# Patient Record
Sex: FEMALE | Race: WHITE | Employment: FULL TIME | URBAN - NONMETROPOLITAN AREA
[De-identification: names, ages, dates, MRNs, and addresses within clinical notes are randomized per-mention and may not be internally consistent; named-entity substitution may affect disease eponyms.]

---

## 2022-02-09 ENCOUNTER — HOSPITAL ENCOUNTER (OUTPATIENT)
Age: 62
Discharge: HOME | End: 2022-02-09
Payer: COMMERCIAL

## 2022-02-09 ENCOUNTER — HOSPITAL ENCOUNTER (OUTPATIENT)
Dept: GENERAL RADIOLOGY | Age: 62
Discharge: HOME | End: 2022-02-09
Payer: COMMERCIAL

## 2022-02-09 DIAGNOSIS — G89.29 CHRONIC ELBOW PAIN, LEFT: ICD-10-CM

## 2022-02-09 DIAGNOSIS — M25.522 CHRONIC ELBOW PAIN, LEFT: ICD-10-CM

## 2022-02-09 PROBLEM — I48.0 PAROXYSMAL ATRIAL FIBRILLATION (HCC): Status: ACTIVE | Noted: 2022-02-09

## 2022-02-09 PROBLEM — I48.91 ATRIAL FIBRILLATION (HCC): Status: ACTIVE | Noted: 2022-02-09

## 2022-02-09 PROBLEM — I47.9 PAROXYSMAL TACHYCARDIA (HCC): Status: ACTIVE | Noted: 2022-02-09

## 2022-02-09 PROCEDURE — 73080 X-RAY EXAM OF ELBOW: CPT

## 2022-05-24 PROBLEM — M48.02 STENOSIS OF CERVICAL SPINE: Status: ACTIVE | Noted: 2022-05-24

## 2022-06-29 ENCOUNTER — HOSPITAL ENCOUNTER (OUTPATIENT)
Age: 62
Discharge: HOME | End: 2022-06-29
Payer: COMMERCIAL

## 2022-06-29 LAB
CHOLESTEROL, TOTAL: 188 MG/DL (ref 100–199)
HDLC SERPL-MCNC: 76 MG/DL
TSH SERPL DL<=0.05 MIU/L-ACNC: 2.48 UIU/ML (ref 0.4–4.2)

## 2022-06-29 PROCEDURE — 36415 COLL VENOUS BLD VENIPUNCTURE: CPT

## 2022-06-29 PROCEDURE — 84443 ASSAY THYROID STIM HORMONE: CPT

## 2022-06-29 PROCEDURE — 82465 ASSAY BLD/SERUM CHOLESTEROL: CPT

## 2022-06-29 PROCEDURE — 83718 ASSAY OF LIPOPROTEIN: CPT

## 2022-09-29 ENCOUNTER — NURSE ONLY (OUTPATIENT)
Dept: LAB | Age: 62
End: 2022-09-29

## 2022-10-10 LAB — CYTOLOGY THIN PREP PAP: NORMAL

## 2023-01-19 ENCOUNTER — APPOINTMENT (OUTPATIENT)
Dept: CT IMAGING | Age: 63
End: 2023-01-19
Payer: COMMERCIAL

## 2023-01-19 ENCOUNTER — HOSPITAL ENCOUNTER (OUTPATIENT)
Dept: INTERVENTIONAL RADIOLOGY/VASCULAR | Age: 63
Discharge: HOME | End: 2023-01-19
Payer: COMMERCIAL

## 2023-01-19 ENCOUNTER — APPOINTMENT (OUTPATIENT)
Dept: MRI IMAGING | Age: 63
End: 2023-01-19
Payer: COMMERCIAL

## 2023-01-19 ENCOUNTER — APPOINTMENT (OUTPATIENT)
Dept: GENERAL RADIOLOGY | Age: 63
End: 2023-01-19
Payer: COMMERCIAL

## 2023-01-19 ENCOUNTER — HOSPITAL ENCOUNTER (EMERGENCY)
Age: 63
Discharge: HOME | End: 2023-01-19
Attending: STUDENT IN AN ORGANIZED HEALTH CARE EDUCATION/TRAINING PROGRAM
Payer: COMMERCIAL

## 2023-01-19 VITALS
BODY MASS INDEX: 31.83 KG/M2 | HEART RATE: 97 BPM | WEIGHT: 173 LBS | DIASTOLIC BLOOD PRESSURE: 79 MMHG | SYSTOLIC BLOOD PRESSURE: 157 MMHG | RESPIRATION RATE: 18 BRPM | TEMPERATURE: 97.2 F | HEIGHT: 62 IN | OXYGEN SATURATION: 96 %

## 2023-01-19 VITALS
SYSTOLIC BLOOD PRESSURE: 140 MMHG | RESPIRATION RATE: 14 BRPM | HEART RATE: 70 BPM | OXYGEN SATURATION: 98 % | TEMPERATURE: 98 F | DIASTOLIC BLOOD PRESSURE: 79 MMHG

## 2023-01-19 DIAGNOSIS — R20.2 PARESTHESIAS: ICD-10-CM

## 2023-01-19 DIAGNOSIS — R20.0 NUMBNESS: Primary | ICD-10-CM

## 2023-01-19 LAB
ALBUMIN SERPL BCG-MCNC: 4.6 G/DL (ref 3.5–5.1)
ALP SERPL-CCNC: 125 U/L (ref 38–126)
ALT SERPL W/O P-5'-P-CCNC: 17 U/L (ref 11–66)
ANION GAP SERPL CALC-SCNC: 12 MEQ/L (ref 8–16)
APTT PPP: 29.3 SECONDS (ref 22–38)
AST SERPL-CCNC: 24 U/L (ref 5–40)
BASOPHILS # BLD: 0.7 %
BASOPHILS ABSOLUTE: 0.1 THOU/MM3 (ref 0–0.1)
BILIRUB SERPL-MCNC: 0.2 MG/DL (ref 0.3–1.2)
BUN SERPL-MCNC: 12 MG/DL (ref 7–22)
CALCIUM SERPL-MCNC: 9.6 MG/DL (ref 8.5–10.5)
CHLORIDE SERPL-SCNC: 103 MEQ/L (ref 98–111)
CO2 SERPL-SCNC: 25 MEQ/L (ref 23–33)
CREAT SERPL-MCNC: 0.7 MG/DL (ref 0.4–1.2)
EKG ATRIAL RATE: 92 BPM
EKG P AXIS: 62 DEGREES
EKG P-R INTERVAL: 176 MS
EKG Q-T INTERVAL: 350 MS
EKG QRS DURATION: 70 MS
EKG QTC CALCULATION (BAZETT): 432 MS
EKG R AXIS: 3 DEGREES
EKG T AXIS: 38 DEGREES
EKG VENTRICULAR RATE: 92 BPM
EOSINOPHIL # BLD: 1.3 %
EOSINOPHILS ABSOLUTE: 0.1 THOU/MM3 (ref 0–0.4)
ERYTHROCYTE [DISTWIDTH] IN BLOOD BY AUTOMATED COUNT: 13.5 % (ref 11.5–14.5)
ERYTHROCYTE [DISTWIDTH] IN BLOOD BY AUTOMATED COUNT: 42.1 FL (ref 35–45)
GFR SERPL CREATININE-BSD FRML MDRD: > 60 ML/MIN/1.73M2
GLUCOSE SERPL-MCNC: 101 MG/DL (ref 70–108)
HCT VFR BLD CALC: 41 % (ref 37–47)
HEMOGLOBIN: 13.7 GM/DL (ref 12–16)
IMMATURE GRANS (ABS): 0.01 THOU/MM3 (ref 0–0.07)
IMMATURE GRANULOCYTES: 0.1 %
INR PPP: 0.94 (ref 0.85–1.13)
LYMPHOCYTES # BLD: 27.6 %
LYMPHOCYTES ABSOLUTE: 2.1 THOU/MM3 (ref 1–4.8)
MCH RBC QN AUTO: 28.8 PG (ref 26–33)
MCHC RBC AUTO-ENTMCNC: 33.4 GM/DL (ref 32.2–35.5)
MCV RBC AUTO: 86.1 FL (ref 81–99)
MONOCYTES # BLD: 3.5 %
MONOCYTES ABSOLUTE: 0.3 THOU/MM3 (ref 0.4–1.3)
NUCLEATED RED BLOOD CELLS: 0 /100 WBC
OSMOLALITY SERPL CALC.SUM OF ELEC: 279.3 MOSMOL/KG (ref 275–300)
PLATELET # BLD: 302 THOU/MM3 (ref 130–400)
PMV BLD AUTO: 9.8 FL (ref 9.4–12.4)
POC CREATININE WHOLE BLOOD: 0.7 MG/DL (ref 0.5–1.2)
POTASSIUM SERPL-SCNC: 3.9 MEQ/L (ref 3.5–5.2)
PROT SERPL-MCNC: 7.1 G/DL (ref 6.1–8)
RBC # BLD: 4.76 MILL/MM3 (ref 4.2–5.4)
SEG NEUTROPHILS: 66.8 %
SEGMENTED NEUTROPHILS ABSOLUTE COUNT: 5 THOU/MM3 (ref 1.8–7.7)
SODIUM SERPL-SCNC: 140 MEQ/L (ref 135–145)
TROPONIN T: < 0.01 NG/ML
WBC # BLD: 7.5 THOU/MM3 (ref 4.8–10.8)

## 2023-01-19 PROCEDURE — 93005 ELECTROCARDIOGRAM TRACING: CPT | Performed by: STUDENT IN AN ORGANIZED HEALTH CARE EDUCATION/TRAINING PROGRAM

## 2023-01-19 PROCEDURE — 6360000002 HC RX W HCPCS: Performed by: RADIOLOGY

## 2023-01-19 PROCEDURE — 6360000004 HC RX CONTRAST MEDICATION: Performed by: STUDENT IN AN ORGANIZED HEALTH CARE EDUCATION/TRAINING PROGRAM

## 2023-01-19 PROCEDURE — 70496 CT ANGIOGRAPHY HEAD: CPT

## 2023-01-19 PROCEDURE — 85730 THROMBOPLASTIN TIME PARTIAL: CPT

## 2023-01-19 PROCEDURE — 84484 ASSAY OF TROPONIN QUANT: CPT

## 2023-01-19 PROCEDURE — 70553 MRI BRAIN STEM W/O & W/DYE: CPT

## 2023-01-19 PROCEDURE — 2709999900 IR NERVE BLOCK PROCEDURE

## 2023-01-19 PROCEDURE — 36415 COLL VENOUS BLD VENIPUNCTURE: CPT

## 2023-01-19 PROCEDURE — 93010 ELECTROCARDIOGRAM REPORT: CPT | Performed by: INTERNAL MEDICINE

## 2023-01-19 PROCEDURE — 99223 1ST HOSP IP/OBS HIGH 75: CPT | Performed by: NURSE PRACTITIONER

## 2023-01-19 PROCEDURE — 70450 CT HEAD/BRAIN W/O DYE: CPT

## 2023-01-19 PROCEDURE — 85025 COMPLETE CBC W/AUTO DIFF WBC: CPT

## 2023-01-19 PROCEDURE — A9579 GAD-BASE MR CONTRAST NOS,1ML: HCPCS | Performed by: STUDENT IN AN ORGANIZED HEALTH CARE EDUCATION/TRAINING PROGRAM

## 2023-01-19 PROCEDURE — 2580000003 HC RX 258: Performed by: RADIOLOGY

## 2023-01-19 PROCEDURE — 70498 CT ANGIOGRAPHY NECK: CPT

## 2023-01-19 PROCEDURE — 82565 ASSAY OF CREATININE: CPT

## 2023-01-19 PROCEDURE — 99285 EMERGENCY DEPT VISIT HI MDM: CPT | Performed by: STUDENT IN AN ORGANIZED HEALTH CARE EDUCATION/TRAINING PROGRAM

## 2023-01-19 PROCEDURE — 85610 PROTHROMBIN TIME: CPT

## 2023-01-19 PROCEDURE — 80053 COMPREHEN METABOLIC PANEL: CPT

## 2023-01-19 PROCEDURE — 71045 X-RAY EXAM CHEST 1 VIEW: CPT

## 2023-01-19 PROCEDURE — 64479 NJX AA&/STRD TFRM EPI C/T 1: CPT

## 2023-01-19 RX ORDER — 0.9 % SODIUM CHLORIDE 0.9 %
1000 INTRAVENOUS SOLUTION INTRAVENOUS ONCE
Status: DISCONTINUED | OUTPATIENT
Start: 2023-01-19 | End: 2023-01-19

## 2023-01-19 RX ORDER — WATER 1000 ML/1000ML
1 INJECTION, SOLUTION INTRAVENOUS ONCE
Status: COMPLETED | OUTPATIENT
Start: 2023-01-19 | End: 2023-01-19

## 2023-01-19 RX ORDER — DEXAMETHASONE SODIUM PHOSPHATE 4 MG/ML
4 INJECTION, SOLUTION INTRA-ARTICULAR; INTRALESIONAL; INTRAMUSCULAR; INTRAVENOUS; SOFT TISSUE ONCE
Status: COMPLETED | OUTPATIENT
Start: 2023-01-19 | End: 2023-01-19

## 2023-01-19 RX ADMIN — WATER 1 ML: 1 INJECTION INTRAMUSCULAR; INTRAVENOUS; SUBCUTANEOUS at 13:50

## 2023-01-19 RX ADMIN — IOPAMIDOL 80 ML: 755 INJECTION, SOLUTION INTRAVENOUS at 14:18

## 2023-01-19 RX ADMIN — DEXAMETHASONE SODIUM PHOSPHATE 4 MG: 4 INJECTION, SOLUTION INTRAMUSCULAR; INTRAVENOUS at 13:50

## 2023-01-19 RX ADMIN — GADOTERIDOL 15 ML: 279.3 INJECTION, SOLUTION INTRAVENOUS at 15:15

## 2023-01-19 ASSESSMENT — ENCOUNTER SYMPTOMS
NAUSEA: 0
VOMITING: 0
COUGH: 0
SORE THROAT: 0
SHORTNESS OF BREATH: 1
ABDOMINAL PAIN: 0
RHINORRHEA: 0

## 2023-01-19 ASSESSMENT — PAIN SCALES - GENERAL
PAINLEVEL_OUTOF10: 9
PAINLEVEL_OUTOF10: 4
PAINLEVEL_OUTOF10: 8

## 2023-01-19 ASSESSMENT — PAIN - FUNCTIONAL ASSESSMENT
PAIN_FUNCTIONAL_ASSESSMENT: 0-10
PAIN_FUNCTIONAL_ASSESSMENT: 0-10

## 2023-01-19 ASSESSMENT — PAIN DESCRIPTION - PAIN TYPE
TYPE: ACUTE PAIN
TYPE: CHRONIC PAIN

## 2023-01-19 ASSESSMENT — PAIN DESCRIPTION - LOCATION
LOCATION: ARM;LEG
LOCATION: ARM
LOCATION: ARM;LEG

## 2023-01-19 ASSESSMENT — PAIN DESCRIPTION - ORIENTATION
ORIENTATION: RIGHT;LEFT
ORIENTATION: RIGHT
ORIENTATION: RIGHT

## 2023-01-19 ASSESSMENT — PAIN DESCRIPTION - DESCRIPTORS
DESCRIPTORS: BURNING
DESCRIPTORS: SHOOTING

## 2023-01-19 ASSESSMENT — PAIN DESCRIPTION - DIRECTION: RADIATING_TOWARDS: DOWN BOTH ARMS

## 2023-01-19 ASSESSMENT — PAIN DESCRIPTION - ONSET: ONSET: ON-GOING

## 2023-01-19 ASSESSMENT — PAIN DESCRIPTION - FREQUENCY: FREQUENCY: CONTINUOUS

## 2023-01-19 NOTE — DISCHARGE INSTRUCTIONS
NERVE BLOCK DISCHARGE INSTRUCTION    1. Take it easy and rest the remainder of the day. 2.  Do not drive for the remainder of the day. 3.  You may use ice on the area of the injection to help alleviate discomfort. Avoid heat for the remainder of the day. 4.  Do not soak in water or use a tub bath or hot tub for the remainder of the day. 5.  You may resume normal eating and drinking. 6. This evening you may resume your pain medications and any other medications you had stopped prior to the injection. 7.  Resume activities starting tomorrow in gradual manner. You may resume physical therapy. 8. Follow up with ordering doctor if you continue to have pain. 9.  If you do have another nerve block ordered, follow instructions below:  Bring someone to drive you home. Nothing to eat or drink 2 hours prior. No blood thinners or aspirin products 5 days prior. 8.  Notify Radiology ([phone]), or go to the emergency room immediately if you develop any of the following symptoms: fever, chills, changes in mental status, severe pain, difficulty breathing, prolonged, severe headache, numbness or weakness in your arms or legs, loss of control of your bladder or bowel, excessive redness, swelling, or drainage from the area of injection.

## 2023-01-19 NOTE — H&P
Formulation and discussion of sedation / procedure plans, risks, benefits, side effects and alternatives with patient and/or responsible adult completed.     Electronically signed by Elo Seay MD on 1/19/2023 at 1:34 PM

## 2023-01-19 NOTE — PROGRESS NOTES
1305: Patient arrived ambulatory with  for nerve root block. Patient states she has held her aspirin for 5 days. Has been NPO since 10 AM.   Patient rights and responsibilities offered to patient. Patient states pain runs down both arms/hands. Burning/painful sensation. Rates about 4//10 at this time.  Hand grasp strong and equal.                             _m___ Safety:       (Environmental)  [place] to environment  Ensure ID band is correct and in place/ allergy band as needed  Assess for fall risk  Initiate fall precautions as applicable (fall band, side rails, etc.)  Call light within reach  Bed in low position/ wheels locked    __m__ Pain:       Assess pain level and characteristics  Administer analgesics as ordered  Assess effectiveness of pain management and report to MD as needed    _m___ Knowledge Deficit:  Assess baseline knowledge  Provide teaching at level of understanding  Provide teaching via preferred learning method  Evaluate teaching effectiveness    _m___ Hemodynamic/Respiratory Status:       (Pre and Post Procedure Monitoring)  Assess/Monitor vital signs and LOC  Assess Baseline SpO2 prior to any sedation  Obtain weight/height  Assess vital signs/ LOC until patient meets discharge criteria  Monitor procedure site and notify MD of any issues

## 2023-01-19 NOTE — ED PROVIDER NOTES
[address] [facility] [address] [address] EMERGENCY DEPT      EMERGENCY MEDICINE     Pt Name: Jeanie Amin  MRN: [id number]  [place] 1960  Date of evaluation: 1/19/2023  Provider: Bill Villanueva DO  Supervising Physician: Kai Adkins MD    CHIEF COMPLAINT       Chief Complaint   Patient presents with    Numbness     HISTORY OF PRESENT ILLNESS   Jeanie Amin is a pleasant 65 y.o. female who presents to the emergency department from  for evaluation of a CODE STROKE. Patient was having a procedure done on C5-C6 when she began to feel shooting pains down her left upper extremity. This was followed by a numb sensation to her right lower extremity and right upper extremity, this was described as a burning sensation. A code stroke was called. Is been noted that the patient has decreased sensation to light touch on her right side extremities. Patient has a history of A. fib with ablation, not on any blood thinners    PASTMEDICAL HISTORY     Past Medical History:   Diagnosis Date    Atrial fibrillation [facility])        Patient Active Problem List   Diagnosis Code    Paroxysmal atrial fibrillation ([facility] [place] [address] I48.0    Stenosis of cervical spine M48.02     SURGICAL HISTORY       Past Surgical History:   Procedure Laterality Date    ABLATION OF DYSRHYTHMIC FOCUS      CARPAL TUNNEL RELEASE Bilateral 2012    HYSTERECTOMY, VAGINAL  2008       CURRENT MEDICATIONS       Discharge Medication List as of 1/19/2023  4:27 PM        CONTINUE these medications which have NOT CHANGED    Details   diltiazem (CARDIZEM SR) 120 MG SR capsule Take 120 mg by mouth 2 times daily. ibuprofen (ADVIL;MOTRIN) 200 MG tablet Take 400 mg by mouth every 6 hours as needed. aspirin 81 MG tablet Take 81 mg by mouth daily. ALLERGIES     has No Known Allergies. FAMILY HISTORY     She indicated that her mother is alive. She indicated that her father is alive. She indicated that her sister is alive.        SOCIAL HISTORY       Social History     Tobacco Use Smoking status: Every Day     Packs/day: 0.50     Years: 40.00     Pack years: 20.00     Types: Cigarettes     Start date: 1/1/1980    Smokeless tobacco: Former   Substance Use Topics    Alcohol use: Yes     Comment: recreational    Drug use: No       PHYSICAL EXAM       ED Triage Vitals [01/19/23 1407]   BP Temp Temp src Heart Rate Resp SpO2 Height Weight   139/79 -- -- (!) 102 (!) 33 100 % -- --       Physical Exam  Vitals and nursing note reviewed. Constitutional:       General: She is not in acute distress. Appearance: She is not ill-appearing or toxic-appearing. HENT:      Head: Normocephalic and atraumatic. Right Ear: External ear normal.      Left Ear: External ear normal.      Nose: Nose normal. No congestion. Mouth/Throat:      Mouth: Mucous membranes are moist.      Pharynx: Oropharynx is clear. Eyes:      Conjunctiva/sclera: Conjunctivae normal.   Cardiovascular:      Rate and Rhythm: Normal rate and regular rhythm. Pulses: Normal pulses. Heart sounds: Normal heart sounds. Pulmonary:      Effort: Pulmonary effort is normal. No respiratory distress. Breath sounds: Normal breath sounds. No wheezing. Abdominal:      General: There is no distension. Palpations: Abdomen is soft. Tenderness: There is no abdominal tenderness. There is no guarding. Musculoskeletal:         General: Normal range of motion. Cervical back: Normal range of motion and neck supple. No tenderness. Lymphadenopathy:      Cervical: No cervical adenopathy. Skin:     General: Skin is warm and dry. Capillary Refill: Capillary refill takes less than 2 seconds. Neurological:      General: No focal deficit present. Mental Status: She is alert and oriented to person, place, and time. Sensory: Sensory deficit (Decreased sensation to light touch on the right upper and right lower extremities) present.       Comments: Ataxia present on the right   Psychiatric: Mood and Affect: Mood normal.       FORMAL DIAGNOSTIC RESULTS     RADIOLOGY: Interpretation per the Radiologist below, if available at the time of this note (none if blank):    XR CHEST PORTABLE   Final Result   There is no acute intrathoracic process. **This report has been created using voice recognition software. It may contain minor errors which are inherent in voice recognition technology. **      Final report electronically signed by Dr Quincy Delcid on 1/19/2023 3:59 PM      MRI BRAIN W WO CONTRAST   Final Result    Normal MRI of the brain. **This report has been created using voice recognition software. It may contain minor errors which are inherent in voice recognition technology. **         Final report electronically signed by Dr. Alec Koch MD on 1/19/2023 3:33 PM      CTA NECK W WO CONTRAST (CODE STROKE)   Final Result       1. Negative CTA of the head and neck. 2. Diffusion MRI scan would be helpful for better evaluation in this patient. **This report has been created using voice recognition software. It may contain minor errors which are inherent in voice recognition technology. **      Final report electronically signed by DR Janay Cerna on 1/19/2023 2:38 PM      CTA HEAD W WO CONTRAST (CODE STROKE)   Final Result       1. Negative CTA of the head and neck. 2. Diffusion MRI scan would be helpful for better evaluation in this patient. **This report has been created using voice recognition software. It may contain minor errors which are inherent in voice recognition technology. **      Final report electronically signed by DR Janay Cerna on 1/19/2023 2:38 PM      CT HEAD WO CONTRAST   Final Result    No evidence of acute intracranial abnormality. Findings were discussed with Dr. Susan Morales via telephone at the time of interpretation. **This report has been created using voice recognition software.  It may contain minor errors which are inherent in voice recognition technology. **      Final report electronically signed by Dr. Ameya Villalba MD on 1/19/2023 2:17 PM          LABS: (none if blank)  Labs Reviewed   CBC WITH AUTO DIFFERENTIAL - Abnormal; Notable for the following components:       Result Value    Monocytes Absolute 0.3 (*)     All other components within normal limits   COMPREHENSIVE METABOLIC PANEL W/ REFLEX TO MG FOR LOW K - Abnormal; Notable for the following components: Total Bilirubin 0.2 (*)     All other components within normal limits   TROPONIN   PROTIME-INR   APTT   ANION GAP   OSMOLALITY   GLOMERULAR FILTRATION RATE, ESTIMATED   POCT GLUCOSE   POCT CREATININE       (Any cultures that may have been sent were not resulted at the time of this patient visit)    [address] / ED COURSE:     1) Number and Complexity of Problems            Problem List This Visit:         Chief Complaint   Patient presents with    Numbness    Paresthesias        Differential Diagnosis includes (but not limited to): Adverse reaction from contrast, adverse procedure reaction, CVA, TIA, anxiety        Diagnoses Considered but I have low suspicion of:   Seizure, ACS             Pertinent Comorbid Conditions:    A. fib    2)  Data Reviewed (none if left blank)          My Independent interpretations:     EKG:      Normal sinus rhythm. Normal rate, normal rhythm, normal axis, no ST ovation, T wave inversion in V2, normal QRS, normal QTC    Imaging: CT head, CTA head and neck, and MRI brain were all read as negative    Labs:      Unremarkable                 Decision Rules/Clinical Scores utilized:  NIH Stroke Scale  Interval: Baseline  Level of Consciousness (1a): Alert  LOC Questions (1b):  Answers both correctly  LOC Commands (1c): Performs both tasks correctly  Best Gaze (2): Normal  Visual (3): No visual loss  Facial Palsy (4): Normal symmetrical movement  Motor Arm, Left (5a): No drift  Motor Arm, Right (5b): No drift  Motor Leg, Left (6a): No drift  Motor Leg, Right (6b): No drift  Limb Ataxia (7): (!) Present in one limb  Sensory (8): (!) Mild to Moderate  Best Language (9): No aphasia  Dysarthria (10): Normal  Extinction and Inattention (11): No abnormality  Total: 2             External Documentation Reviewed:         Previous patient encounter documents & history available on EMR was reviewed first time seen here             See Formal Diagnostic Results above for the lab and radiology tests and orders. 3)  Treatment and Disposition         ED Reassessment: Patient is pain-free and her symptoms have improved         Case discussed with consulting clinician: Yes, Viry Lay was there to evaluate the Pt. Shared Decision-Making was performed and disposition discussed with the        Patient/Family and questions answered yes         Social determinants of health impacting treatment or disposition: N/A         Code Status: Unknown      Summary of Patient Presentation:      MDM  Number of Diagnoses or Management Options  Diagnosis management comments: Patient is a 41-year-old female, well-appearing, who presents as a code stroke. I initially evaluated the patient in the CT scanner. She initially had an NIH of 2. The symptoms started shortly after she was undergoing a procedure and was injected with some contrast.  Patient has had some pain on the left upper extremity as well as some paresthesias down the left lower extremity, this made me initially doubtful that this is a true stroke given that the work-up was for right-sided extremity decreased sensation to light touch. CT head, CT head neck, MRI brain were negative. Patient's lab work was unremarkable. It is possible the patient had an adverse reaction to the procedure contrast, either way I do not think she has had a stroke and I think she is safe to go home. I made sure she was able to ambulate without difficulty before leaving, which she was. Amount and/or Complexity of Data Reviewed  Clinical lab tests: ordered and reviewed  Tests in the radiology section of CPT®: ordered and reviewed  Tests in the medicine section of CPT®: ordered and reviewed  Discuss the patient with other providers: yes  Independent visualization of images, tracings, or specimens: yes    Risk of Complications, Morbidity, and/or Mortality  Presenting problems: moderate  Management options: low    Critical Care  Total time providing critical care: 30-74 minutes    Patient Progress  Patient progress: improved  /  ED Course as of 01/19/23 1719   u Jan 19, 2023   1606 Patient states that now the numbness is just to the bottom of her right foot and that her right arm feels heavy but she feels like she can move both of her arms [AC]      ED Course User Index  [AC] Almas Zuluaga DO     Vitals Reviewed:    Vitals:    01/19/23 1407 01/19/23 1516 01/19/23 1548   BP: 139/79  (!) 140/79   Pulse: (!) 102 75 70   Resp: (!) 33 18 14   Temp:  98 °F (36.7 °C)    TempSrc:  Oral    SpO2: 100% 99% 98%       The patient was seen and examined. Appropriate diagnostic testing was performed and results reviewed with the patient. The results of pertinent diagnostic studies and exam findings were discussed. The patients provisional diagnosis and plan of care were discussed with the patient and present family who expressed understanding. Any medications were reviewed and indications and risks of medications were discussed with the patient /family present. Strict verbal and written return precautions, instructions and appropriate follow-up provided to  the patient.      ED Medications administered this visit:  (None if blank)  Medications   gadoteridol (PROHANCE) injection 13 mL (13 mLs IntraVENous Not Given 1/19/23 1515)   iopamidol (ISOVUE-370) 76 % injection 80 mL (80 mLs IntraVENous Given 1/19/23 1418)   gadoteridol (PROHANCE) injection 15 mL (15 mLs IntraVENous Given 1/19/23 1515) PROCEDURES: (None if blank)  Procedures:     CRITICAL CARE: (None if blank)      DISCHARGE PRESCRIPTIONS: (None if blank)  Discharge Medication List as of 1/19/2023  4:27 PM          FINAL IMPRESSION      1. Numbness    2. Paresthesias          DISPOSITION/PLAN   DISPOSITION Decision To Discharge 01/19/2023 04:27:09 PM      OUTPATIENT FOLLOW UP THE PATIENT:  Mindy Benz, APRN - CNP  [address]  [phone]    In 2 days  As needed    [facility] EMERGENCY DEPT  [address]    If symptoms worsen    Bill Villanueva DO    This transcription was electronically signed. Parts of this transcriptions may have been dictated by use of voice recognition software and electronically transcribed, and parts may have been transcribed with the assistance of an ED scribe. The transcription may contain errors not detected in proofreading. Please refer to my supervising physician's documentation if my documentation differs.     Electronically Signed: Bill Villanueva DO, 01/19/23, 5:19 PM      Bill Villanueva DO  Resident  01/19/23 6898

## 2023-01-19 NOTE — DISCHARGE INSTRUCTIONS
Follow-up with your primary care doctor within the next day or 2. Return here if symptoms worsen. Return here if develop difficulty speaking or difficulty moving arm or leg.

## 2023-01-19 NOTE — CONSULTS
Neurology Stroke Alert Note    Date:1/19/2023       Room:[address] [place] [address]  Patient Name:Leena Zuñiga     YOB: 1960     Age:62 y.o. Requesting Physician: Gracy Quiroga MD     Reason for Consult:  Evaluate for stroke alert    Subjective       HPI: Rabia Vazquez who is a 65 y.o. female with a history of atrial fibrillation s/p ablation who presents to [address] [place] [address] interventional radiology today for a left-sided C5-C6 nerve block by Dr. Jourdan Mccall. The procedure was initiated and shortly after the patient started to complain of right shoulder, right arm, right leg pain and numbness for which a stroke alert was activated. Her last known well was 1:55 PM.  She was promptly taken to the CT scanner where a CT of her head showed no acute intracranial abnormalities. CT angiogram head and neck negative for any hemodynamically significant stenosis. Her NIH was 2 for right-sided sensory deficit, right upper extremity ataxia. She was not deemed a candidate for TNK due to low NIH with minimal deficit. She is not deemed a candidate for thrombectomy due to lack of large vessel occlusion on CT angiogram head and neck. She denies any history of stroke. She does take a baby aspirin daily after her heart ablation and reports smoking approximately 10 cigarettes a day. She denies any lightheadedness, dizziness, headache, speech difficulty, vision changes, or facial droop. She did complain of chest tightness and difficulty breathing. Last known well: 1:55 PM.  Time of stroke alert: 1:58 PM   Time of neurology arrival: 1:59 PM.  Vascular risk factors: Atrial fibrillation. Initial glucose: 96.  Old deficits from prior stroke: N/A. INR in ED if anticoagulated:  not applicable. Initial blood pressure: 156/79. Initial NIHSS: 2.  Pre-morbid Modified [place] Scale: 0. Time of initial imaging read: 2:17 PM.  Thrombolytic administered:  not indicated/contraindicated.   Thrombectomy performed:  not indicated. Puncture time:  not applicable. Review of Systems   Review of Systems   Constitutional:  Negative for chills and fever. HENT:  Negative for rhinorrhea and sore throat. Eyes:  Negative for visual disturbance. Respiratory:  Positive for shortness of breath. Negative for cough. Cardiovascular:  Negative for chest pain and palpitations. Chest tightness   Gastrointestinal:  Negative for abdominal pain, nausea and vomiting. Genitourinary:  Negative for dysuria. Musculoskeletal:  Negative for arthralgias and myalgias. Skin:  Negative for rash. Neurological:  Negative for dizziness, weakness, numbness and headaches. Psychiatric/Behavioral:  The patient is not nervous/anxious. Medications   Scheduled Meds:   Continuous Infusions:   PRN Meds:   Medications Prior to Admission:   No current facility-administered medications on file prior to encounter. Current Outpatient Medications on File Prior to Encounter   Medication Sig Dispense Refill    diltiazem (CARDIZEM SR) 120 MG SR capsule Take 120 mg by mouth 2 times daily. ibuprofen (ADVIL;MOTRIN) 200 MG tablet Take 400 mg by mouth every 6 hours as needed. aspirin 81 MG tablet Take 81 mg by mouth daily. Past History    Past Medical History:   has a past medical history of Atrial fibrillation ([facility] [address] Social History:   reports that she has been smoking cigarettes. She started smoking about 43 years ago. She has a 20.00 pack-year smoking history. She has quit using smokeless tobacco. She reports current alcohol use. She reports that she does not use drugs. Family History:   Family History   Problem Relation Age of Onset    Hypertension Mother     Hypertension Father     Cancer Sister         thyroid       Physical Examination        NIH Stroke Scale:  1a.  Level of consciousness:  0 - alert; keenly responsive  1b. Level of consciousness questions:  0 - answers both questions correctly  1c.   Level of consciousness questions:  0 - performs both tasks correctly  2. Best Gaze:  0 - normal  3. Visual:  0 - no visual loss  4. Facial Palsy:  0 - normal symmetric movement  5a. Motor left arm:  0 - no drift, limb holds 90 (or 45) degrees for full 10 seconds  5b. Motor right arm:  0 - no drift, limb holds 90 (or 45) degrees for full 10 seconds  6a. Motor left le - no drift; leg holds 30 degree position for full 5 seconds  6b. Motor right le - no drift; leg holds 30 degree position for full 5 seconds  7. Limb Ataxia:  1 - present in one limb  8. Sensory:  1 - mild to moderate sensory loss; patient feels pinprick is less sharp or is dull on the affected side; there is a loss of superficial pain with pinprick but patient is aware of being touched   9. Best Language:  0 - no aphasia, normal  10. Dysarthria:  0 - normal  11. Extinction and Inattention:  0 - no abnormality    TOTAL:  2    Vitals:  /79   Pulse (!) 102   Resp (!) 33   SpO2 100%   Temp (24hrs), Av.2 °F (36.2 °C), Min:97.2 °F (36.2 °C), Max:97.2 °F (36.2 °C)      I/O (24Hr): No intake or output data in the 24 hours ending 23 1505    Physical Exam  Vitals reviewed. Constitutional:       General: She is not in acute distress. Appearance: Normal appearance. She is not ill-appearing. HENT:      Head: Normocephalic and atraumatic. Right Ear: External ear normal.      Left Ear: External ear normal.      Nose: Nose normal.      Mouth/Throat:      Mouth: Mucous membranes are moist.      Pharynx: No oropharyngeal exudate or posterior oropharyngeal erythema. Eyes:      Extraocular Movements: EOM normal.      Pupils: Pupils are equal, round, and reactive to light. Cardiovascular:      Rate and Rhythm: Normal rate and regular rhythm. Heart sounds: Normal heart sounds. No murmur heard. Pulmonary:      Effort: Pulmonary effort is normal. No respiratory distress.       Breath sounds: Normal breath sounds. No wheezing. Abdominal:      General: Bowel sounds are normal.      Palpations: Abdomen is soft. Tenderness: There is no abdominal tenderness. Musculoskeletal:      Right lower leg: No edema. Left lower leg: No edema. Skin:     General: Skin is warm. Findings: No rash. Neurological:      Mental Status: She is alert and oriented to person, place, and time. Coordination: Finger-Nose-Finger Test abnormal (Mild ataxia on the right). Heel to [facility] Test normal.      Deep Tendon Reflexes:      Reflex Scores:       Bicep reflexes are 2+ on the right side and 2+ on the left side. Brachioradialis reflexes are 2+ on the right side and 2+ on the left side. Patellar reflexes are 2+ on the right side and 2+ on the left side. Psychiatric:         Speech: Speech normal.      Comments: Anxious     Neurologic Exam     Mental Status   Oriented to person, place, and time. Registration: recalls 3 of 3 objects. Follows 2 step commands. Attention: normal. Concentration: normal.   Speech: speech is normal   Level of consciousness: alert  Knowledge: good. Able to name object. Able to read. Able to repeat. Normal comprehension. Cranial Nerves     CN II   Visual fields full to confrontation. CN III, IV, VI   Pupils are equal, round, and reactive to light. Extraocular motions are normal.   Right pupil: Size: 3 mm. Shape: regular. Reactivity: brisk. Left pupil: Size: 3 mm. Shape: regular. Reactivity: brisk. CN V   Facial sensation intact. CN VII   Facial expression full, symmetric.      CN VIII   CN VIII normal.     CN IX, X   CN IX normal.   CN X normal.   Palate: symmetric    CN XI   CN XI normal.   Right trapezius strength: normal  Left trapezius strength: normal    CN XII   CN XII normal.   Tongue: not atrophic  Fasciculations: absent  Tongue deviation: none    Motor Exam   Muscle bulk: normal  Overall muscle tone: normal  Right arm pronator drift: absent  Left arm pronator drift: absent    RUE: 4+/5  LUE: 5/5  BLE: 5/5     Sensory Exam   Right arm, right leg numbness     Gait, Coordination, and Reflexes     Coordination   Finger to nose coordination: abnormal (Mild ataxia on the right)  Heel to shin coordination: normal    Tremor   Resting tremor: absent  Intention tremor: absent  Action tremor: absent    Reflexes   Right brachioradialis: 2+  Left brachioradialis: 2+  Right biceps: 2+  Left biceps: 2+  Right patellar: 2+  Left patellar: 2+  Right ankle clonus: absent     Labs/Imaging/Diagnostics   Labs:  CBC:No results for input(s): WBC, RBC, HGB, HCT, MCV, RDW, PLT in the last 72 hours. CHEMISTRIES:No results for input(s): NA, K, CL, CO2, BUN, CREATININE, GLUCOSE, CA, PHOS, MG in the last 72 hours. COAGULATION STUDIES:No results for input(s): PROTIME, INR, APTT in the last 72 hours. LIVER PROFILE:No results for input(s): AST, ALT, BILIDIR, BILITOT, ALKPHOS in the last 72 hours. CHOLESTEROL AND A1C:No results for input(s): LDLCALC, HDL, CHOL, TRIG, LABA1C in the last 720 hours. Imaging Last 24 Hours:  CTA HEAD W WO CONTRAST (CODE STROKE)    Result Date: 1/19/2023  PROCEDURE: CTA HEAD W WO CONTRAST, CTA NECK W WO CONTRAST CLINICAL INFORMATION: stroke. COMPARISON: CT scan of the brain on the same day. TECHNIQUE: 1 mm axial images were obtained through the head and neck after the fast bolus administration of contrast. A noncontrast localizer was obtained. 3-D reconstructions were performed on a dedicated 3-D workstation. These include multiplanar MPR images and multiplanar MIP images. Centerline reconstructions were obtained of the carotid systems. Isovue intravenous contrast was given. All carotid artery measurements are performed utilizing Nascet criteria. This exam was analyzed using viz. AI contact CT LVO.  All CT scans at this facility use dose modulation, iterative reconstruction, and/or weight-based dosing when appropriate to reduce radiation dose to as low as reasonably achievable. FINDINGS: CTA NECK:  Aortic arch and branches: There is normal origin of the brachiocephalic, left common carotid and left subclavian arteries from the aortic arch Right common carotid artery/ICA: There is no significant hemodynamic stenosis in the right common and internal carotid arteries. Left common carotid artery/ICA: There is no significant hemodynamic stenosis in the left common and internal carotid arteries. Vertebral arteries: There is antegrade flow in the right and left vertebral arteries. CTA HEAD: Internal carotid arteries: There is antegrade flow in the internal carotid arteries bilaterally. Middle cerebral arteries: There is antegrade flow in the middle cerebral arteries bilaterally. Adrián Brian Anterior cerebral arteries: There is antegrade flow in the anterior cerebral arteries bilaterally. . Vertebral arteries: There is antegrade flow in both distal vertebral arteries. Basilar artery: There is antegrade flow in the basilar artery. Superior cerebellar arteries: There is antegrade flow in the left and right superior cerebellar arteries. Posterior cerebral arteries: There is antegrade flow in the posterior cerebral arteries. The proximal branches are also normal. No aneurysms, stenoses or occlusions are noted. The superior sagittal sinus, vein of Nikolas, internal cerebral veins, straight sinus, transverse sinuses and sigmoid sinuses are patent. Axial source data: There is increased soft tissue density in the nasopharynx consistent with enlarged adenoids. There is mild enlargement of the right and left lobes of the thyroid gland. There are postoperative changes in the cervical spine between C5 and C7.      1. Negative CTA of the head and neck. 2. Diffusion MRI scan would be helpful for better evaluation in this patient. **This report has been created using voice recognition software. It may contain minor errors which are inherent in voice recognition technology. ** Final report electronically signed by DR Wilmer Juarez on 1/19/2023 2:38 PM    CT HEAD WO CONTRAST    Result Date: 1/19/2023  PROCEDURE: CT HEAD WO CONTRAST CLINICAL INFORMATION: Stroke Symptoms. COMPARISON: CT head dated 9/5/2011. TECHNIQUE: Noncontrast 5 mm axial images were obtained through the brain. Sagittal and coronal reconstructions were created. All CT scans at this facility use dose modulation, iterative reconstruction, and/or weight-based dosing when appropriate to reduce radiation dose to as low as reasonably achievable. FINDINGS: The ventricles, cisterns and sulci are symmetric and normal in size and configuration. White matter differentiation appears grossly preserved. No intracranial hemorrhage, mass effect or midline shift is identified. The calvarium appears intact. Orbits are unremarkable. Paranasal sinuses and mastoid air cells are clear. No evidence of acute intracranial abnormality. Findings were discussed with Dr. Timothy Lee via telephone at the time of interpretation. **This report has been created using voice recognition software. It may contain minor errors which are inherent in voice recognition technology. ** Final report electronically signed by Dr. Jayla Hayes MD on 1/19/2023 2:17 PM    CTA NECK W WO CONTRAST (CODE STROKE)    Result Date: 1/19/2023  PROCEDURE: CTA HEAD W WO CONTRAST, CTA NECK W WO CONTRAST CLINICAL INFORMATION: stroke. COMPARISON: CT scan of the brain on the same day. TECHNIQUE: 1 mm axial images were obtained through the head and neck after the fast bolus administration of contrast. A noncontrast localizer was obtained. 3-D reconstructions were performed on a dedicated 3-D workstation. These include multiplanar MPR images and multiplanar MIP images. Centerline reconstructions were obtained of the carotid systems. Isovue intravenous contrast was given. All carotid artery measurements are performed utilizing Nascet criteria. This exam was analyzed using viz. AI contact CT LVO.  All CT scans at this facility use dose modulation, iterative reconstruction, and/or weight-based dosing when appropriate to reduce radiation dose to as low as reasonably achievable. FINDINGS: CTA NECK:  Aortic arch and branches: There is normal origin of the brachiocephalic, left common carotid and left subclavian arteries from the aortic arch Right common carotid artery/ICA: There is no significant hemodynamic stenosis in the right common and internal carotid arteries. Left common carotid artery/ICA: There is no significant hemodynamic stenosis in the left common and internal carotid arteries. Vertebral arteries: There is antegrade flow in the right and left vertebral arteries. CTA HEAD: Internal carotid arteries: There is antegrade flow in the internal carotid arteries bilaterally. Middle cerebral arteries: There is antegrade flow in the middle cerebral arteries bilaterally. Erika Jozef Anterior cerebral arteries: There is antegrade flow in the anterior cerebral arteries bilaterally. . Vertebral arteries: There is antegrade flow in both distal vertebral arteries. Basilar artery: There is antegrade flow in the basilar artery. Superior cerebellar arteries: There is antegrade flow in the left and right superior cerebellar arteries. Posterior cerebral arteries: There is antegrade flow in the posterior cerebral arteries. The proximal branches are also normal. No aneurysms, stenoses or occlusions are noted. The superior sagittal sinus, vein of Nikolas, internal cerebral veins, straight sinus, transverse sinuses and sigmoid sinuses are patent. Axial source data: There is increased soft tissue density in the nasopharynx consistent with enlarged adenoids. There is mild enlargement of the right and left lobes of the thyroid gland. There are postoperative changes in the cervical spine between C5 and C7.      1. Negative CTA of the head and neck. 2. Diffusion MRI scan would be helpful for better evaluation in this patient.  **This report has been created using voice recognition software. It may contain minor errors which are inherent in voice recognition technology. ** Final report electronically signed by DR Mj Mcdonough on 1/19/2023 2:38 PM    XR CHEST PORTABLE    Result Date: 1/19/2023  PROCEDURE: XR CHEST PORTABLE CLINICAL INFORMATION: 71-year-old female who developed strokelike symptoms during a cervical nerve block. COMPARISON: No prior study. TECHNIQUE: AP upright view of the chest was obtained. FINDINGS: Fusion hardware is partially visualized in the cervical spine. The lungs are clear. The cardiac silhouette and pulmonary vasculature are within normal limits. There is no significant pleural effusion or pneumothorax. Visualized portions of the upper abdomen are within normal limits. The osseous structures are intact. No acute fractures or suspicious osseous lesions. There is no acute intrathoracic process. **This report has been created using voice recognition software. It may contain minor errors which are inherent in voice recognition technology. ** Final report electronically signed by Dr Namrata Austin on 1/19/2023 3:59 PM    MRI BRAIN W WO CONTRAST    Result Date: 1/19/2023  PROCEDURE: MRI BRAIN W WO CONTRAST INDICATION:stroke like symptoms, recent C5, C6 injection, right side deficit. . COMPARISON: CT head from the same date. TECHNIQUE: Multiplanar and multiple spin echo T1 and T2-weighted images were obtained through the brain before and after the administration of intravenous contrast. 15 cc ProHance was injected in the right AC. FINDINGS: The ventricles, cisterns and sulci are symmetric and normal in size and configuration. No significant focal areas of abnormal T2/flair prolongation are identified within the parenchyma. No intra or extra-axial mass is identified. No focal areas of restricted diffusion are present. Following contrast administration, there are no focal areas of abnormal parenchymal or meningeal enhancement identified.  The major vascular flow voids appear patent. Orbits are unremarkable. Paranasal sinuses and mastoid air cells are clear. Normal MRI of the brain. **This report has been created using voice recognition software. It may contain minor errors which are inherent in voice recognition technology. ** Final report electronically signed by Dr. Carmelina Nguyen MD on 1/19/2023 3:33 PM    IR NERVE BLOCK PROCEDURE    Result Date: 1/19/2023   CERVICAL EPIDURAL STEROID INJECTION / TRANSFORAMINAL TECHNIQUE (selective nerve root block): CLINICAL INFORMATION:  Cervical radiculopathy. PERFORMED BY: Amina Paz. Olivier Mcfarland M.D. APPROACH: Transforaminal, C5-6, left side. FLUOROSCOPY TIME: 1 minute 57 seconds FLUOROSCOPIC IMAGES: 1 ESTIMATED BLOOD LOSS: Minimal Prior cervical epidural injections past 12 months: None. MEDICATION:  1 ml sterile water, 4 mg dexamethasone. PROCEDURE:  Signed informed consent was obtained prior to performing this procedure. The patient was placed on the fluoroscopic table in a supine position. The cervical spine was evaluated fluoroscopically to determine appropriate puncture site. The skin was marked, prepped, and draped in a sterile fashion. A 25-gauge spinal needle was then passed through the soft tissues of the neck making contact with the superior facet process at level specified above. The needle was then walked-off the facet process in an anterior direction into the transforaminal cervical epidural space. A small amount of iodinated contrast material was injected to confirm appropriate needle position and a fluoroscopic image was obtained for documentation. The medications listed above were then injected and the needle was removed. During the injection the patient began to complain of neurological symptoms. The procedure was then aborted and a code stroke was called. Successful transforaminal cervical block at C5-C6 on the left.  The patient began to demonstrate neurological symptoms upon injection and a code stroke was called. The procedure was then aborted. **This report has been created using voice recognition software. It may contain minor errors which are inherent in voice recognition technology. ** Final report electronically signed by Dr Alonso Kay on 1/19/2023 3:33 PM           Assessment and Plan:        Right sided arm and leg paresthesias and pain   CT head negative for any acute intracranial abnormalities  CT angiogram head and neck negative for any hemodynamically significant stenosis  Stat MRI brain with and without contrast negative for any acute intracranial abnormalities   If pain is controlled and she feels safe to go home, she is ok for discharge from a neurology standpoint      This case was discussed with Dr. Melquiades Lerma and he is in agreement with the assessment and plan.     Electronically signed by DORA Salas CNP on 1/19/23 at 4:31 PM EST

## 2023-01-19 NOTE — ED TRIAGE NOTES
Patient to ED from special procedures where patient was being given a nerve block. Patient states that she began having right side numbness and tingling. Patient on RN arrival appears to be hyperventilating and in a panic like manor. NIH 2. Patient denies taking blood thinners. Patient reports feeling short of breath. Special procedures report given her a 1 ml of contrast dye.

## 2023-01-22 ENCOUNTER — HOSPITAL ENCOUNTER (EMERGENCY)
Age: 63
Discharge: HOME | End: 2023-01-22
Attending: STUDENT IN AN ORGANIZED HEALTH CARE EDUCATION/TRAINING PROGRAM
Payer: COMMERCIAL

## 2023-01-22 VITALS
HEART RATE: 85 BPM | RESPIRATION RATE: 16 BRPM | DIASTOLIC BLOOD PRESSURE: 80 MMHG | SYSTOLIC BLOOD PRESSURE: 117 MMHG | OXYGEN SATURATION: 97 % | TEMPERATURE: 98.1 F

## 2023-01-22 DIAGNOSIS — M79.2 NEUROPATHIC PAIN: Primary | ICD-10-CM

## 2023-01-22 PROCEDURE — 99284 EMERGENCY DEPT VISIT MOD MDM: CPT

## 2023-01-22 PROCEDURE — 96372 THER/PROPH/DIAG INJ SC/IM: CPT

## 2023-01-22 PROCEDURE — 6360000002 HC RX W HCPCS: Performed by: STUDENT IN AN ORGANIZED HEALTH CARE EDUCATION/TRAINING PROGRAM

## 2023-01-22 RX ORDER — CYCLOBENZAPRINE HCL 10 MG
10 TABLET ORAL 3 TIMES DAILY PRN
Qty: 30 TABLET | Refills: 0 | Status: SHIPPED | OUTPATIENT
Start: 2023-01-22 | End: 2023-01-23 | Stop reason: SINTOL

## 2023-01-22 RX ORDER — ORPHENADRINE CITRATE 30 MG/ML
60 INJECTION INTRAMUSCULAR; INTRAVENOUS ONCE
Status: COMPLETED | OUTPATIENT
Start: 2023-01-22 | End: 2023-01-22

## 2023-01-22 RX ADMIN — ORPHENADRINE CITRATE 60 MG: 30 INJECTION INTRAMUSCULAR; INTRAVENOUS at 06:55

## 2023-01-22 ASSESSMENT — PAIN SCALES - GENERAL
PAINLEVEL_OUTOF10: 7
PAINLEVEL_OUTOF10: 6

## 2023-01-22 ASSESSMENT — PAIN - FUNCTIONAL ASSESSMENT
PAIN_FUNCTIONAL_ASSESSMENT: 0-10
PAIN_FUNCTIONAL_ASSESSMENT: 0-10

## 2023-01-22 ASSESSMENT — PAIN DESCRIPTION - LOCATION
LOCATION: GENERALIZED

## 2023-01-22 NOTE — DISCHARGE INSTRUCTIONS
Continue take your home medication as prescribed. Use Flexeril as needed to help with your pain. Follow-up with your primary care physician next week.

## 2023-01-22 NOTE — ED PROVIDER NOTES
[facility]      EMERGENCY MEDICINE     Pt Name: Colton Gallegos  MRN: [id number]  [place] 1960  Date of evaluation: 1/22/2023  Provider: Sergey Thompson, [facility],[address]       Chief Complaint   Patient presents with    Tingling     Pt arrives c/o \"numbness\" area from Thurs expanding in rt leg to above the knee and radiating into bilateral shoulder blades      HISTORY OF PRESENT ILLNESS   Colton Gallegos is a pleasant 65 y.o. female who presents to the emergency department from from home, as a walk in to the ED lobby for evaluation of diffuse pain and sensation. Patient had an epidural injection in her neck on 1/19. Patient had initiation of neurological symptoms during her procedure. Is code stroke was called. Patient had negative CTAs of the head and neck as well as a negative MRI of the brain. The patient has not not had resolution of her neurological symptoms since then. It was felt at that time she was safe for discharge home follow-up primary care physician. She saw her primary care physician on 1/20. They prescribed her meloxicam for pain but did not do anything else at that time. Patient presents with worsening needlelike sensation in her right leg that is spread up her right side into the muscles in her back. Patient denies any fevers or chills. Patient denies any bowel or bladder incontinence. Patient has any fevers or chills. Patient denies any IV drug use.     PASTMEDICAL HISTORY     Past Medical History:   Diagnosis Date    Atrial fibrillation [facility])        Patient Active Problem List   Diagnosis Code    Paroxysmal atrial fibrillation (HCC) I48.0    Stenosis of cervical spine M48.02     SURGICAL HISTORY       Past Surgical History:   Procedure Laterality Date    ABLATION OF DYSRHYTHMIC FOCUS      CARPAL TUNNEL RELEASE Bilateral 2012    HYSTERECTOMY, VAGINAL  2008       CURRENT MEDICATIONS       Previous Medications    ASPIRIN 81 MG TABLET    Take 81 mg by mouth daily. DILTIAZEM (CARDIZEM SR) 120 MG SR CAPSULE    Take 120 mg by mouth 2 times daily. IBUPROFEN (ADVIL;MOTRIN) 200 MG TABLET    Take 400 mg by mouth every 6 hours as needed. MELOXICAM (MOBIC) 7.5 MG TABLET    Take 1 tablet by mouth daily       ALLERGIES     has No Known Allergies. FAMILY HISTORY     She indicated that her mother is alive. She indicated that her father is alive. She indicated that her sister is alive. SOCIAL HISTORY       Social History     Tobacco Use    Smoking status: Every Day     Packs/day: 0.50     Years: 40.00     Pack years: 20.00     Types: Cigarettes     Start date: 1/1/1980    Smokeless tobacco: Former   Substance Use Topics    Alcohol use: Yes     Comment: recreational    Drug use: No       PHYSICAL EXAM       ED Triage Vitals [01/22/23 0625]   BP Temp Temp Source Heart Rate Resp SpO2 Height Weight   (!) 130/96 98.1 °F (36.7 °C) Oral 86 18 96 % -- --       Additional Vital Signs:  Vitals:    01/22/23 0720   BP: 117/80   Pulse: 85   Resp: 16   Temp:    SpO2: 97%     Physical Exam  Vitals and nursing note reviewed. Exam conducted with a chaperone present. Constitutional:       General: She is not in acute distress. Appearance: Normal appearance. She is normal weight. She is not ill-appearing or toxic-appearing. HENT:      Head: Normocephalic and atraumatic. Right Ear: External ear normal.      Left Ear: External ear normal.      Nose: Nose normal.      Mouth/Throat:      Mouth: Mucous membranes are moist.      Pharynx: Oropharynx is clear. Eyes:      Extraocular Movements: Extraocular movements intact. Pupils: Pupils are equal, round, and reactive to light. Cardiovascular:      Rate and Rhythm: Normal rate and regular rhythm. Pulmonary:      Effort: Pulmonary effort is normal.      Breath sounds: Normal breath sounds. Abdominal:      General: Abdomen is flat. There is no distension. Palpations: Abdomen is soft. Tenderness:  There is no abdominal tenderness. There is no guarding or rebound. Musculoskeletal:         General: Normal range of motion. Cervical back: Normal range of motion and neck supple. No rigidity or tenderness. Right lower leg: No edema. Left lower leg: No edema. Skin:     General: Skin is warm and dry. Capillary Refill: Capillary refill takes less than 2 seconds. Neurological:      Mental Status: She is alert and oriented to person, place, and time. Cranial Nerves: Cranial nerves 2-12 are intact. Sensory: Sensory deficit (Patient has sensation throughout her entire body, but reports that on her right side is more pins-and-needles and not entirely within normal limits.) present. Motor: Motor function is intact. Comments: Patient has no saddle anesthesia. FORMAL DIAGNOSTIC RESULTS     RADIOLOGY: Interpretation per the Radiologist below, if available at the time of this note (none if blank):     No orders to display       LABS: (none if blank)  Labs Reviewed - No data to display    (Any cultures that may have been sent were not resulted at the time of this patient visit)    [facility] / ED COURSE:     1) Number and Complexity of Problems            Problem List This Visit:         Chief Complaint   Patient presents with    Tingling     Pt arrives c/o \"numbness\" area from Thurs expanding in rt leg to above the knee and radiating into bilateral shoulder blades             Differential Diagnosis includes (but not limited to):  Muscle spasms, neuropathic pain        Diagnoses Considered but I have low suspicion of:   Syringomyelia, spinal cord damage,             Pertinent Comorbid Conditions:    Cervical stenosis    2)  Data Reviewed (none if left blank)          My Independent interpretations:     EKG:      none    Imaging: none    Labs:      none                 Decision Rules/Clinical Scores utilized:  none            External Documentation Reviewed: Previous patient encounter documents & history available on EMR was reviewed. Patient had MRI of the brain as well as CTA of the head and neck on 1/19. Patient follow-up with her primary care physician on 1/20             See Formal Diagnostic Results above for the lab and radiology tests and orders. 3)  Treatment and Disposition         ED Reassessment: Stable, mildly improved         Case discussed with consulting clinician:  none         Shared Decision-Making was performed and disposition discussed with the        Patient/Family and questions answered          Social determinants of health impacting treatment or disposition:           Code Status:  not addressed      Summary of Patient Presentation:      MDM  Number of Diagnoses or Management Options  Neuropathic pain: established, worsening     Amount and/or Complexity of Data Reviewed  Decide to obtain previous medical records or to obtain history from someone other than the patient: yes  Review and summarize past medical records: yes    Risk of Complications, Morbidity, and/or Mortality  Presenting problems: low  Diagnostic procedures: low  Management options: low  General comments: I reviewed all the patient's work-up over the last 3 days. Patient had MRI of the brain as well as CTAs of the head and neck. These were all negative. Since the patient has had continued symptoms since the initiation of her injection, this is likely a sequelae from the injection. Patient did receive dexamethasone, so it is likely that she still has symptoms from that injection. She describes neuropathic/muscle spasms, so I felt it was reasonable to treat the patient with Norflex. I will send the patient home with a prescription for Flexeril. I feel like any repeat imaging in the emergency department today would not elicit any significant change in the patient's work-up and outcome since she is already been ruled out for stroke.     Patient Progress  Patient progress: stable  /   Vitals Reviewed:    Vitals:    01/22/23 0625 01/22/23 0720   BP: (!) 130/96 117/80   Pulse: 86 85   Resp: 18 16   Temp: 98.1 °F (36.7 °C)    TempSrc: Oral    SpO2: 96% 97%       The patient was seen and examined. Appropriate diagnostic testing was performed and results reviewed with the patient. The results of pertinent diagnostic studies and exam findings were discussed. The patients provisional diagnosis and plan of care were discussed with the patient and present family who expressed understanding. Any medications were reviewed and indications and risks of medications were discussed with the patient /family present. Strict verbal and written return precautions, instructions and appropriate follow-up provided to  the patient . ED Medications administered this visit:  (None if blank)  Medications   orphenadrine (NORFLEX) injection 60 mg (60 mg IntraMUSCular Given 1/22/23 0655)         PROCEDURES: (None if blank)  Procedures:     CRITICAL CARE: (None if blank)      DISCHARGE PRESCRIPTIONS: (None if blank)  New Prescriptions    CYCLOBENZAPRINE (FLEXERIL) 10 MG TABLET    Take 1 tablet by mouth 3 times daily as needed for Muscle spasms       FINAL IMPRESSION      1.  Neuropathic pain          DISPOSITION/PLAN   DISPOSITION Decision To Discharge 01/22/2023 07:22:08 AM      OUTPATIENT FOLLOW UP THE PATIENT:  DORA Bruce CNP  [address]  01/22/23 9905

## 2023-01-22 NOTE — ED NOTES
Pt states tingling and pain persists. States \"I'm ok\". Plans to call her physician on Monday.       Gini Alonzo RN  01/22/23 3694

## 2023-01-22 NOTE — ED NOTES
Pt alert and oriented. Respirations regular and easy. Prescriptions given and instructed on. Discharge instructions reviewed. States understanding. Pt discharged in satisfactory condition.        Eleanor Robledo RN  01/22/23 1196

## 2023-03-09 ENCOUNTER — HOSPITAL ENCOUNTER (OUTPATIENT)
Dept: MRI IMAGING | Age: 63
Discharge: HOME | End: 2023-03-09
Payer: COMMERCIAL

## 2023-03-09 DIAGNOSIS — Z98.1 SPINAL ARTHRODESIS PRESENT: ICD-10-CM

## 2023-03-09 DIAGNOSIS — R20.0 NUMBNESS: ICD-10-CM

## 2023-03-09 PROCEDURE — 6360000004 HC RX CONTRAST MEDICATION: Performed by: PHYSICIAN ASSISTANT

## 2023-03-09 PROCEDURE — 72156 MRI NECK SPINE W/O & W/DYE: CPT

## 2023-03-09 PROCEDURE — A9579 GAD-BASE MR CONTRAST NOS,1ML: HCPCS | Performed by: PHYSICIAN ASSISTANT

## 2023-03-09 RX ADMIN — GADOTERIDOL 18 ML: 279.3 INJECTION, SOLUTION INTRAVENOUS at 13:41

## 2023-08-16 PROBLEM — M50.321 DEGENERATION OF INTERVERTEBRAL DISC AT C4-C5 LEVEL: Status: ACTIVE | Noted: 2023-08-16

## 2023-08-16 PROBLEM — S14.0XXA TRAUMATIC EDEMA OF CERVICAL SPINAL CORD (HCC): Status: ACTIVE | Noted: 2023-08-16

## 2023-10-18 ENCOUNTER — OFFICE VISIT (OUTPATIENT)
Dept: PHYSICAL MEDICINE AND REHAB | Age: 63
End: 2023-10-18
Payer: COMMERCIAL

## 2023-10-18 VITALS
WEIGHT: 171.4 LBS | DIASTOLIC BLOOD PRESSURE: 64 MMHG | HEIGHT: 62 IN | BODY MASS INDEX: 31.54 KG/M2 | SYSTOLIC BLOOD PRESSURE: 116 MMHG

## 2023-10-18 DIAGNOSIS — R53.81 DEBILITY: ICD-10-CM

## 2023-10-18 DIAGNOSIS — M47.812 CERVICAL SPONDYLOSIS: ICD-10-CM

## 2023-10-18 DIAGNOSIS — M79.18 MYOFASCIAL PAIN: ICD-10-CM

## 2023-10-18 DIAGNOSIS — R26.89 BALANCE DISORDER: ICD-10-CM

## 2023-10-18 DIAGNOSIS — M70.71 ISCHIAL BURSITIS OF RIGHT SIDE: ICD-10-CM

## 2023-10-18 DIAGNOSIS — Z98.1 S/P CERVICAL SPINAL FUSION: ICD-10-CM

## 2023-10-18 DIAGNOSIS — G62.9 NEUROPATHY: Primary | ICD-10-CM

## 2023-10-18 DIAGNOSIS — M47.816 LUMBAR SPONDYLOSIS: ICD-10-CM

## 2023-10-18 PROCEDURE — 99205 OFFICE O/P NEW HI 60 MIN: CPT | Performed by: NURSE PRACTITIONER

## 2023-10-26 ENCOUNTER — TELEPHONE (OUTPATIENT)
Dept: PHYSICAL MEDICINE AND REHAB | Age: 63
End: 2023-10-26

## 2023-10-26 NOTE — TELEPHONE ENCOUNTER
Received call from pt and at her request refaxed order for therapy to [facility] PT services.

## 2023-12-13 ENCOUNTER — OFFICE VISIT (OUTPATIENT)
Dept: PHYSICAL MEDICINE AND REHAB | Age: 63
End: 2023-12-13
Payer: COMMERCIAL

## 2023-12-13 VITALS
WEIGHT: 171 LBS | SYSTOLIC BLOOD PRESSURE: 110 MMHG | BODY MASS INDEX: 31.47 KG/M2 | DIASTOLIC BLOOD PRESSURE: 68 MMHG | HEIGHT: 62 IN

## 2023-12-13 DIAGNOSIS — R26.89 BALANCE DISORDER: ICD-10-CM

## 2023-12-13 DIAGNOSIS — Z98.1 S/P CERVICAL SPINAL FUSION: ICD-10-CM

## 2023-12-13 DIAGNOSIS — M79.18 MYOFASCIAL PAIN: ICD-10-CM

## 2023-12-13 DIAGNOSIS — G62.9 NEUROPATHY: Primary | ICD-10-CM

## 2023-12-13 DIAGNOSIS — M51.36 BULGING LUMBAR DISC: ICD-10-CM

## 2023-12-13 DIAGNOSIS — M47.812 CERVICAL SPONDYLOSIS: ICD-10-CM

## 2023-12-13 DIAGNOSIS — M47.816 LUMBAR SPONDYLOSIS: ICD-10-CM

## 2023-12-13 DIAGNOSIS — M54.16 LUMBAR RADICULOPATHY: ICD-10-CM

## 2023-12-13 DIAGNOSIS — R53.81 DEBILITY: ICD-10-CM

## 2023-12-13 DIAGNOSIS — M48.061 SPINAL STENOSIS OF LUMBAR REGION WITHOUT NEUROGENIC CLAUDICATION: ICD-10-CM

## 2023-12-13 PROCEDURE — 99215 OFFICE O/P EST HI 40 MIN: CPT | Performed by: NURSE PRACTITIONER

## 2023-12-13 RX ORDER — GABAPENTIN 300 MG/1
CAPSULE ORAL
COMMUNITY
Start: 2023-10-25

## 2024-07-18 PROBLEM — M25.50 POLYARTHRALGIA: Status: ACTIVE | Noted: 2024-07-18

## 2024-07-18 PROBLEM — M51.36 DDD (DEGENERATIVE DISC DISEASE), LUMBAR: Status: ACTIVE | Noted: 2024-07-18

## 2024-07-18 PROBLEM — Z98.1 HX OF FUSION OF CERVICAL SPINE: Status: ACTIVE | Noted: 2024-07-18

## 2024-07-18 PROBLEM — M51.369 DDD (DEGENERATIVE DISC DISEASE), LUMBAR: Status: ACTIVE | Noted: 2024-07-18

## 2024-08-14 ENCOUNTER — HOSPITAL ENCOUNTER (OUTPATIENT)
Age: 64
Discharge: HOME | End: 2024-08-14
Payer: COMMERCIAL

## 2024-08-14 DIAGNOSIS — Z00.00 ENCOUNTER FOR WELL ADULT EXAM WITHOUT ABNORMAL FINDINGS: ICD-10-CM

## 2024-08-14 LAB
ALBUMIN SERPL BCG-MCNC: 3.9 G/DL (ref 3.5–5.1)
ALP SERPL-CCNC: 108 U/L (ref 38–126)
ALT SERPL W/O P-5'-P-CCNC: 11 U/L (ref 11–66)
ANION GAP SERPL CALC-SCNC: 10 MEQ/L (ref 8–16)
AST SERPL-CCNC: 15 U/L (ref 5–40)
BASOPHILS ABSOLUTE: 0.1 THOU/MM3 (ref 0–0.1)
BASOPHILS NFR BLD AUTO: 0.8 %
BILIRUB CONJ SERPL-MCNC: < 0.1 MG/DL (ref 0.1–13.8)
BILIRUB SERPL-MCNC: < 0.2 MG/DL (ref 0.3–1.2)
BUN SERPL-MCNC: 18 MG/DL (ref 7–22)
CALCIUM SERPL-MCNC: 9.2 MG/DL (ref 8.5–10.5)
CHLORIDE SERPL-SCNC: 105 MEQ/L (ref 98–111)
CHOLESTEROL, FASTING: 132 MG/DL (ref 100–199)
CO2 SERPL-SCNC: 27 MEQ/L (ref 23–33)
CREAT SERPL-MCNC: 0.7 MG/DL (ref 0.4–1.2)
DEPRECATED RDW RBC AUTO: 45.4 FL (ref 35–45)
EOSINOPHIL NFR BLD AUTO: 3.5 %
EOSINOPHILS ABSOLUTE: 0.3 THOU/MM3 (ref 0–0.4)
ERYTHROCYTE [DISTWIDTH] IN BLOOD BY AUTOMATED COUNT: 13.4 % (ref 11.5–14.5)
GFR SERPL CREATININE-BSD FRML MDRD: > 90 ML/MIN/1.73M2
GLUCOSE SERPL-MCNC: 83 MG/DL (ref 70–108)
HCT VFR BLD AUTO: 37.5 % (ref 37–47)
HCV IGG SERPL QL IA: NEGATIVE
HDLC SERPL-MCNC: 48 MG/DL
HGB BLD-MCNC: 12.1 GM/DL (ref 12–16)
IMM GRANULOCYTES # BLD AUTO: 0.02 THOU/MM3 (ref 0–0.07)
IMM GRANULOCYTES NFR BLD AUTO: 0.2 %
LDLC SERPL CALC-MCNC: 69 MG/DL
LYMPHOCYTES ABSOLUTE: 4 THOU/MM3 (ref 1–4.8)
LYMPHOCYTES NFR BLD AUTO: 45.3 %
MCH RBC QN AUTO: 29.7 PG (ref 26–33)
MCHC RBC AUTO-ENTMCNC: 32.3 GM/DL (ref 32.2–35.5)
MCV RBC AUTO: 92.1 FL (ref 81–99)
MONOCYTES ABSOLUTE: 0.6 THOU/MM3 (ref 0.4–1.3)
MONOCYTES NFR BLD AUTO: 6.5 %
NEUTROPHILS ABSOLUTE: 3.8 THOU/MM3 (ref 1.8–7.7)
NEUTROPHILS NFR BLD AUTO: 43.7 %
NRBC BLD AUTO-RTO: 0 /100 WBC
PLATELET # BLD AUTO: 363 THOU/MM3 (ref 130–400)
PMV BLD AUTO: 10.5 FL (ref 9.4–12.4)
POTASSIUM SERPL-SCNC: 4.6 MEQ/L (ref 3.5–5.2)
PROT SERPL-MCNC: 6.5 G/DL (ref 6.1–8)
RBC # BLD AUTO: 4.07 MILL/MM3 (ref 4.2–5.4)
SODIUM SERPL-SCNC: 142 MEQ/L (ref 135–145)
TRIGLYCERIDE, FASTING: 76 MG/DL (ref 0–199)
TSH SERPL DL<=0.005 MIU/L-ACNC: 1.53 UIU/ML (ref 0.4–4.2)
WBC # BLD AUTO: 8.8 THOU/MM3 (ref 4.8–10.8)

## 2024-08-14 PROCEDURE — 87389 HIV-1 AG W/HIV-1&-2 AB AG IA: CPT

## 2024-08-14 PROCEDURE — 86803 HEPATITIS C AB TEST: CPT

## 2024-08-14 PROCEDURE — 80061 LIPID PANEL: CPT

## 2024-08-14 PROCEDURE — 85025 COMPLETE CBC W/AUTO DIFF WBC: CPT

## 2024-08-14 PROCEDURE — 80053 COMPREHEN METABOLIC PANEL: CPT

## 2024-08-14 PROCEDURE — 84443 ASSAY THYROID STIM HORMONE: CPT

## 2024-08-14 PROCEDURE — 82248 BILIRUBIN DIRECT: CPT

## 2024-08-14 PROCEDURE — 36415 COLL VENOUS BLD VENIPUNCTURE: CPT

## 2024-08-15 LAB — HIV 1+2 AB+HIV1 P24 AG SERPL QL IA: NORMAL

## 2024-08-31 ENCOUNTER — APPOINTMENT (OUTPATIENT)
Dept: GENERAL RADIOLOGY | Age: 64
DRG: 871 | End: 2024-08-31
Attending: EMERGENCY MEDICINE
Payer: COMMERCIAL

## 2024-08-31 ENCOUNTER — APPOINTMENT (OUTPATIENT)
Dept: CT IMAGING | Age: 64
DRG: 871 | End: 2024-08-31
Attending: EMERGENCY MEDICINE
Payer: COMMERCIAL

## 2024-08-31 ENCOUNTER — HOSPITAL ENCOUNTER (INPATIENT)
Age: 64
LOS: 6 days | Discharge: HOME | DRG: 871 | End: 2024-09-06
Attending: EMERGENCY MEDICINE
Payer: COMMERCIAL

## 2024-08-31 DIAGNOSIS — J18.9 PNEUMONIA OF BOTH LOWER LOBES DUE TO INFECTIOUS ORGANISM: ICD-10-CM

## 2024-08-31 DIAGNOSIS — N30.01 ACUTE CYSTITIS WITH HEMATURIA: ICD-10-CM

## 2024-08-31 DIAGNOSIS — J96.01 ACUTE RESPIRATORY FAILURE WITH HYPOXIA (HCC): Primary | ICD-10-CM

## 2024-08-31 DIAGNOSIS — I33.0 BACTERIAL ENDOCARDITIS, UNSPECIFIED CHRONICITY: ICD-10-CM

## 2024-08-31 LAB
ALBUMIN SERPL BCP-MCNC: 2.8 GM/DL (ref 3.4–5)
ALP SERPL-CCNC: 193 U/L (ref 46–116)
ALT SERPL W P-5'-P-CCNC: 21 U/L (ref 14–63)
AMORPH SED URNS QL MICRO: ABNORMAL
ANION GAP SERPL CALC-SCNC: 12 MEQ/L (ref 8–16)
AST SERPL W P-5'-P-CCNC: 19 U/L (ref 15–37)
BACTERIA URNS QL MICRO: ABNORMAL
BASOPHILS # BLD: 0.1 % (ref 0–3)
BASOPHILS ABSOLUTE: 0 THOU/MM3 (ref 0–0.1)
BILIRUB SERPL-MCNC: 0.4 MG/DL (ref 0.2–1)
BILIRUB UR QL STRIP.AUTO: NEGATIVE
BUN SERPL-MCNC: 14 MG/DL (ref 7–18)
CALCIUM SERPL-MCNC: 9.5 MG/DL (ref 8.5–10.1)
CASTS #/AREA URNS LPF: ABNORMAL /LPF
CHARACTER UR: ABNORMAL
CHLORIDE SERPL-SCNC: 98 MEQ/L (ref 98–107)
CO2 SERPL-SCNC: 27 MEQ/L (ref 21–32)
COLOR UR AUTO: ABNORMAL
CREAT SERPL-MCNC: 1.1 MG/DL (ref 0.6–1.3)
CRP SERPL-MCNC: 25.75 MG/DL (ref 0–1)
CRYSTALS VITF MICRO: ABNORMAL
EOSINOPHILS ABSOLUTE: 0.1 THOU/MM3 (ref 0–0.5)
EOSINOPHILS RELATIVE PERCENT: 0.4 % (ref 0–4)
EPI CELLS #/AREA URNS HPF: ABNORMAL /HPF
ERYTHROCYTE [SEDIMENTATION RATE] IN BLOOD BY WESTERGREN METHOD: 90 MM/HR (ref 0–20)
GFR SERPL CREATININE-BSD FRML MDRD: 57 ML/MIN/1.73M2
GLUCOSE SERPL-MCNC: 116 MG/DL (ref 74–106)
GLUCOSE UR QL STRIP.AUTO: NEGATIVE MG/DL
HCT VFR BLD CALC: 36.1 % (ref 37–47)
HEMOGLOBIN: 11.8 GM/DL (ref 12–16)
HGB UR STRIP.AUTO-MCNC: ABNORMAL MG/L
IMMATURE GRANS (ABS): 0.2 THOU/MM3 (ref 0–0.07)
IMMATURE GRANULOCYTES %: 1 %
INFLUENZA A BY PCR: NOT DETECTED
INFLUENZA B BY PCR: NOT DETECTED
KETONES UR QL STRIP.AUTO: NEGATIVE
LACTATE: 0.62 MMOL/L (ref 0.9–1.7)
LEUKOCYTE ESTERASE UR QL STRIP.AUTO: ABNORMAL
LIPASE SERPL-CCNC: 22 U/L (ref 16–77)
LYMPHOCYTES # BLD AUTO: 14.9 % (ref 15–47)
LYMPHOCYTES ABSOLUTE: 2.9 THOU/MM3 (ref 1–4.8)
MCH RBC QN AUTO: 29 PG (ref 26–32)
MCHC RBC AUTO-ENTMCNC: 32.7 GM/DL (ref 31–35)
MCV RBC AUTO: 88.7 FL (ref 81–99)
MONOCYTES: 1.7 THOU/MM3 (ref 0.3–1.3)
MONOCYTES: 8.5 % (ref 0–12)
MUCOUS THREADS URNS QL MICRO: ABNORMAL
NEUTROPHILS ABSOLUTE: 14.7 THOU/MM3 (ref 1.8–7.7)
NITRITE UR QL STRIP.AUTO: POSITIVE
PDW BLD-RTO: 13.4 % (ref 11.5–14.9)
PH UR STRIP.AUTO: 7 [PH] (ref 5–9)
PLATELET # BLD AUTO: 402 THOU/MM3 (ref 130–400)
PMV BLD AUTO: 9.3 FL (ref 9.4–12.4)
POTASSIUM SERPL-SCNC: 4 MEQ/L (ref 3.5–5.1)
PROCALCITONIN SERPL IA-MCNC: 0.25 NG/ML (ref 0.01–0.09)
PROT SERPL-MCNC: 7.8 GM/DL (ref 6.4–8.2)
PROT UR STRIP.AUTO-MCNC: >= 300 MG/DL
RBC # BLD: 4.07 MILL/MM3 (ref 4.1–5.3)
RBC #/AREA URNS HPF: ABNORMAL /HPF
REFLEX TO URINE C & S: ABNORMAL
SARS-COV-2 RNA, RT PCR: NOT DETECTED
SEG NEUTROPHILS: 75.6 % (ref 43–75)
SODIUM SERPL-SCNC: 137 MEQ/L (ref 136–145)
SP GR UR STRIP.AUTO: 1.02 (ref 1–1.03)
UROBILINOGEN UR STRIP-ACNC: 1 EU/DL (ref 0–1)
WBC # BLD: 19.5 THOU/MM3 (ref 4.8–10.8)
WBC # UR STRIP.AUTO: ABNORMAL /HPF

## 2024-08-31 PROCEDURE — 81001 URINALYSIS AUTO W/SCOPE: CPT

## 2024-08-31 PROCEDURE — 36415 COLL VENOUS BLD VENIPUNCTURE: CPT

## 2024-08-31 PROCEDURE — 96365 THER/PROPH/DIAG IV INF INIT: CPT

## 2024-08-31 PROCEDURE — 2580000003 HC RX 258

## 2024-08-31 PROCEDURE — 99285 EMERGENCY DEPT VISIT HI MDM: CPT

## 2024-08-31 PROCEDURE — 83605 ASSAY OF LACTIC ACID: CPT

## 2024-08-31 PROCEDURE — 80053 COMPREHEN METABOLIC PANEL: CPT

## 2024-08-31 PROCEDURE — 87040 BLOOD CULTURE FOR BACTERIA: CPT

## 2024-08-31 PROCEDURE — 87636 SARSCOV2 & INF A&B AMP PRB: CPT

## 2024-08-31 PROCEDURE — 87086 URINE CULTURE/COLONY COUNT: CPT

## 2024-08-31 PROCEDURE — 86140 C-REACTIVE PROTEIN: CPT

## 2024-08-31 PROCEDURE — 6360000002 HC RX W HCPCS

## 2024-08-31 PROCEDURE — 99223 1ST HOSP IP/OBS HIGH 75: CPT

## 2024-08-31 PROCEDURE — 87077 CULTURE AEROBIC IDENTIFY: CPT

## 2024-08-31 PROCEDURE — 71046 X-RAY EXAM CHEST 2 VIEWS: CPT

## 2024-08-31 PROCEDURE — 2580000003 HC RX 258: Performed by: EMERGENCY MEDICINE

## 2024-08-31 PROCEDURE — 85651 RBC SED RATE NONAUTOMATED: CPT

## 2024-08-31 PROCEDURE — 85025 COMPLETE CBC W/AUTO DIFF WBC: CPT

## 2024-08-31 PROCEDURE — 6370000000 HC RX 637 (ALT 250 FOR IP): Performed by: EMERGENCY MEDICINE

## 2024-08-31 PROCEDURE — 6370000000 HC RX 637 (ALT 250 FOR IP)

## 2024-08-31 PROCEDURE — 1200000003 HC TELEMETRY R&B

## 2024-08-31 PROCEDURE — 87186 SC STD MICRODIL/AGAR DIL: CPT

## 2024-08-31 PROCEDURE — 83690 ASSAY OF LIPASE: CPT

## 2024-08-31 PROCEDURE — 6360000002 HC RX W HCPCS: Performed by: EMERGENCY MEDICINE

## 2024-08-31 PROCEDURE — 74176 CT ABD & PELVIS W/O CONTRAST: CPT

## 2024-08-31 PROCEDURE — 84145 PROCALCITONIN (PCT): CPT

## 2024-08-31 PROCEDURE — 87801 DETECT AGNT MULT DNA AMPLI: CPT

## 2024-08-31 RX ORDER — ASPIRIN 81 MG/1
81 TABLET, CHEWABLE ORAL DAILY
Status: DISCONTINUED | OUTPATIENT
Start: 2024-09-01 | End: 2024-09-06 | Stop reason: HOSPADM

## 2024-08-31 RX ORDER — SODIUM CHLORIDE 9 MG/ML
INJECTION, SOLUTION INTRAVENOUS PRN
Status: DISCONTINUED | OUTPATIENT
Start: 2024-08-31 | End: 2024-09-06 | Stop reason: HOSPADM

## 2024-08-31 RX ORDER — SODIUM CHLORIDE 9 MG/ML
INJECTION, SOLUTION INTRAVENOUS CONTINUOUS
Status: ACTIVE | OUTPATIENT
Start: 2024-08-31 | End: 2024-09-01

## 2024-08-31 RX ORDER — DILTIAZEM HYDROCHLORIDE 60 MG/1
120 CAPSULE, EXTENDED RELEASE ORAL 2 TIMES DAILY
Status: DISCONTINUED | OUTPATIENT
Start: 2024-08-31 | End: 2024-09-06 | Stop reason: HOSPADM

## 2024-08-31 RX ORDER — SODIUM CHLORIDE 0.9 % (FLUSH) 0.9 %
5-40 SYRINGE (ML) INJECTION EVERY 12 HOURS SCHEDULED
Status: DISCONTINUED | OUTPATIENT
Start: 2024-08-31 | End: 2024-09-06 | Stop reason: HOSPADM

## 2024-08-31 RX ORDER — POLYETHYLENE GLYCOL 3350 17 G/17G
17 POWDER, FOR SOLUTION ORAL DAILY PRN
Status: DISCONTINUED | OUTPATIENT
Start: 2024-08-31 | End: 2024-09-06 | Stop reason: HOSPADM

## 2024-08-31 RX ORDER — GABAPENTIN 300 MG/1
600 CAPSULE ORAL NIGHTLY
Status: DISCONTINUED | OUTPATIENT
Start: 2024-08-31 | End: 2024-09-06 | Stop reason: HOSPADM

## 2024-08-31 RX ORDER — 0.9 % SODIUM CHLORIDE 0.9 %
1000 INTRAVENOUS SOLUTION INTRAVENOUS ONCE
Status: COMPLETED | OUTPATIENT
Start: 2024-08-31 | End: 2024-08-31

## 2024-08-31 RX ORDER — ACETAMINOPHEN 325 MG/1
650 TABLET ORAL EVERY 6 HOURS PRN
Status: DISCONTINUED | OUTPATIENT
Start: 2024-08-31 | End: 2024-09-06 | Stop reason: HOSPADM

## 2024-08-31 RX ORDER — PANTOPRAZOLE SODIUM 40 MG/1
40 TABLET, DELAYED RELEASE ORAL DAILY
Status: DISCONTINUED | OUTPATIENT
Start: 2024-09-01 | End: 2024-09-06 | Stop reason: HOSPADM

## 2024-08-31 RX ORDER — LEVOFLOXACIN 5 MG/ML
750 INJECTION, SOLUTION INTRAVENOUS ONCE
Status: COMPLETED | OUTPATIENT
Start: 2024-08-31 | End: 2024-08-31

## 2024-08-31 RX ORDER — SODIUM CHLORIDE 0.9 % (FLUSH) 0.9 %
5-40 SYRINGE (ML) INJECTION PRN
Status: DISCONTINUED | OUTPATIENT
Start: 2024-08-31 | End: 2024-09-06 | Stop reason: HOSPADM

## 2024-08-31 RX ORDER — ONDANSETRON 4 MG/1
4 TABLET, ORALLY DISINTEGRATING ORAL EVERY 8 HOURS PRN
Status: DISCONTINUED | OUTPATIENT
Start: 2024-08-31 | End: 2024-09-06 | Stop reason: HOSPADM

## 2024-08-31 RX ORDER — ACETAMINOPHEN 650 MG/1
650 SUPPOSITORY RECTAL EVERY 6 HOURS PRN
Status: DISCONTINUED | OUTPATIENT
Start: 2024-08-31 | End: 2024-09-06 | Stop reason: HOSPADM

## 2024-08-31 RX ORDER — ONDANSETRON 2 MG/ML
4 INJECTION INTRAMUSCULAR; INTRAVENOUS EVERY 6 HOURS PRN
Status: DISCONTINUED | OUTPATIENT
Start: 2024-08-31 | End: 2024-09-06 | Stop reason: HOSPADM

## 2024-08-31 RX ORDER — METRONIDAZOLE 500 MG/100ML
500 INJECTION, SOLUTION INTRAVENOUS EVERY 8 HOURS
Status: DISCONTINUED | OUTPATIENT
Start: 2024-08-31 | End: 2024-09-04

## 2024-08-31 RX ORDER — ENOXAPARIN SODIUM 100 MG/ML
40 INJECTION SUBCUTANEOUS DAILY
Status: DISCONTINUED | OUTPATIENT
Start: 2024-08-31 | End: 2024-08-31

## 2024-08-31 RX ORDER — POTASSIUM CHLORIDE 1500 MG/1
40 TABLET, EXTENDED RELEASE ORAL PRN
Status: DISCONTINUED | OUTPATIENT
Start: 2024-08-31 | End: 2024-09-06 | Stop reason: HOSPADM

## 2024-08-31 RX ORDER — IBUPROFEN 200 MG
600 TABLET ORAL ONCE
Status: COMPLETED | OUTPATIENT
Start: 2024-08-31 | End: 2024-08-31

## 2024-08-31 RX ORDER — GABAPENTIN 300 MG/1
300 CAPSULE ORAL EVERY MORNING
Status: DISCONTINUED | OUTPATIENT
Start: 2024-09-01 | End: 2024-09-06 | Stop reason: HOSPADM

## 2024-08-31 RX ORDER — MAGNESIUM SULFATE IN WATER 40 MG/ML
2000 INJECTION, SOLUTION INTRAVENOUS PRN
Status: DISCONTINUED | OUTPATIENT
Start: 2024-08-31 | End: 2024-09-06 | Stop reason: HOSPADM

## 2024-08-31 RX ORDER — POTASSIUM CHLORIDE 7.45 MG/ML
10 INJECTION INTRAVENOUS PRN
Status: DISCONTINUED | OUTPATIENT
Start: 2024-08-31 | End: 2024-09-06 | Stop reason: HOSPADM

## 2024-08-31 RX ADMIN — SODIUM CHLORIDE: 9 INJECTION, SOLUTION INTRAVENOUS at 22:31

## 2024-08-31 RX ADMIN — DILTIAZEM HYDROCHLORIDE 120 MG: 60 CAPSULE, EXTENDED RELEASE ORAL at 21:22

## 2024-08-31 RX ADMIN — GABAPENTIN 600 MG: 300 CAPSULE ORAL at 21:22

## 2024-08-31 RX ADMIN — SODIUM CHLORIDE 1000 ML: 9 INJECTION, SOLUTION INTRAVENOUS at 16:46

## 2024-08-31 RX ADMIN — LEVOFLOXACIN 750 MG: 750 INJECTION, SOLUTION INTRAVENOUS at 17:44

## 2024-08-31 RX ADMIN — IBUPROFEN 600 MG: 200 TABLET, FILM COATED ORAL at 16:43

## 2024-08-31 RX ADMIN — CEFTRIAXONE SODIUM 2000 MG: 2 INJECTION, POWDER, FOR SOLUTION INTRAMUSCULAR; INTRAVENOUS at 22:34

## 2024-08-31 RX ADMIN — SODIUM CHLORIDE, PRESERVATIVE FREE 10 ML: 5 INJECTION INTRAVENOUS at 21:23

## 2024-08-31 RX ADMIN — METRONIDAZOLE 500 MG: 500 INJECTION, SOLUTION INTRAVENOUS at 23:08

## 2024-08-31 ASSESSMENT — PAIN DESCRIPTION - ORIENTATION: ORIENTATION: MID

## 2024-08-31 ASSESSMENT — PAIN SCALES - GENERAL: PAINLEVEL_OUTOF10: 6

## 2024-08-31 ASSESSMENT — PAIN - FUNCTIONAL ASSESSMENT: PAIN_FUNCTIONAL_ASSESSMENT: 0-10

## 2024-08-31 ASSESSMENT — PAIN DESCRIPTION - LOCATION
LOCATION: ABDOMEN
LOCATION: HEAD;GENERALIZED

## 2024-08-31 NOTE — ED NOTES
Patient sent is stable condition on 2L O2 to [facility] for further treatment. Belongings sent with patient's .

## 2024-08-31 NOTE — ED PROVIDER NOTES
[facility]  [address] [place] [id number]  Phone: [phone]  EMERGENCY DEPARTMENT ENCOUNTER      Pt Name: Leena Zuñiga  MRN: [id number]  Birthdate 1960  Date of evaluation: 8/31/2024  Provider: Abhijeet Gallo MD    CHIEF COMPLAINT       Chief Complaint   Patient presents with    Fever    Generalized Body Aches         HISTORY OF PRESENT ILLNESS      Leena Zuñiga is a 63 y.o. female who presents to the emergency department with above noted complaint.  Patient has had some generalized bodyaches and feeling good all week.  Has fever now to 1.7.  Has some upper abdominal discomfort of a rather vague.  No associated vomiting or diarrhea.  She does feel short of breath.        REVIEW OF SYSTEMS     Positive shortness of breath.  Positive for fever.  No urinary symptoms  Review of Systems  All systems negative except as marked.     PAST MEDICAL HISTORY     Past Medical History:   Diagnosis Date    Atrial fibrillation (HCC)          SURGICAL HISTORY       Past Surgical History:   Procedure Laterality Date    ABLATION OF DYSRHYTHMIC FOCUS      CARPAL TUNNEL RELEASE Bilateral 2012    HYSTERECTOMY, VAGINAL  2008    NECK SURGERY  06/01/2022    C5-C7         CURRENT MEDICATIONS       Previous Medications    ASCORBIC ACID (VITAMIN C) 500 MG TABLET    Take 2 tablets by mouth every morning    ASPIRIN 81 MG TABLET    Take 1 tablet by mouth daily    CYANOCOBALAMIN (B-12) 3000 MCG LOZG        DILTIAZEM (CARDIZEM 12 HR) 120 MG EXTENDED RELEASE CAPSULE    Take 1 capsule by mouth 2 times daily    GABAPENTIN (NEURONTIN) 300 MG CAPSULE    take 1 capsule by mouth every morning then 1 capsule IN THE AFTERNOON and 2 capsules at bedtime    HANDICAP PLACARD MISC    by Does not apply route x5 years    MELOXICAM (MOBIC) 15 MG TABLET    Take 1 tablet by mouth daily    PANTOPRAZOLE (PROTONIX) 40 MG TABLET    Take 1 tablet by mouth daily    PREDNISONE (DELTASONE) 10 MG TABLET    Take 5 tablets by  urgent intervention.      PROCEDURES:  None  Procedures     FINAL IMPRESSION      1. Acute respiratory failure with hypoxia (HCC)    2. Pneumonia of both lower lobes due to infectious organism    3. Acute cystitis with hematuria    4.      Abnormal liver mass    DISPOSITION/PLAN     DISPOSITION    Condition at Disposition: Data Unavailable      PATIENT REFERRED TO:  No follow-up provider specified.    DISCHARGE MEDICATIONS:  New Prescriptions    No medications on file       (Please note that portions of this note were completed with a voice recognition program.  Efforts were made to edit the dictations but occasionally words are mis-transcribed.)    Abhijeet Gallo MD (electronically signed)  Attending Emergency Physician                       Abhijeet Gallo MD  08/31/24 6282

## 2024-08-31 NOTE — ED NOTES
Patient presents with complaint of fever, headache, body aches. States that her symptoms started on Monday. Denies being around anyone else who was sick. States that she has not taken any medications today including tylenol or motrin because she doesn't feel well. Patient noted to be 88% on room air. Placed on 2L o2. Respirations are even and unlabored. Skin is pink warm and dry.

## 2024-08-31 NOTE — ED NOTES
ED to inpatient nurses report    Chief Complaint   Patient presents with    Fever    Generalized Body Aches      Present to ED from home  LOC: alert and orientated to name, place, date  Vital signs   Vitals:    08/31/24 1745 08/31/24 1800 08/31/24 1825 08/31/24 1840   BP: 102/70 101/69 107/74 103/68   Pulse:       Resp:       Temp:  97.8 °F (36.6 °C)     TempSrc:  Temporal     SpO2: 95% 96% 91% 91%      Oxygen Baseline was 87-88% on room air upon presenting to er. Placed on 2L O2.     Current needs required 2L via nasal cannula Bipap/Cpap No  LDAs:   Peripheral IV 08/31/24 Left Antecubital (Active)   Site Assessment Clean, dry & intact 08/31/24 1840   Line Status Infusing 08/31/24 1840   Dressing Status Clean, dry & intact 08/31/24 1840     Mobility: Independent  Pending ED orders: finishing levaquin. Sent with ems transfer.  Present condition: Patient alert and oriented x4. Presents from home with complaint of fever and generalized body aches. Negative for flu and covid. Labs as noted. Vitals as noted. Belongings sent with patient's  except for clothes that she is wearing. Iv is patent and infusing.     C-SSRS Risk of Suicide: No Risk  Swallow Screening    Preferred Language: English     Electronically signed by Demetria Badillo RN on 8/31/2024 at 7:03 PM

## 2024-09-01 ENCOUNTER — APPOINTMENT (OUTPATIENT)
Dept: MRI IMAGING | Age: 64
DRG: 871 | End: 2024-09-01
Payer: COMMERCIAL

## 2024-09-01 LAB
ACB COMPLEX DNA BLD POS QL NAA+NON-PROBE: NOT DETECTED
ANION GAP SERPL CALC-SCNC: 12 MEQ/L (ref 8–16)
B FRAGILIS DNA BLD POS QL NAA+NON-PROBE: NOT DETECTED
BASOPHILS ABSOLUTE: 0.1 THOU/MM3 (ref 0–0.1)
BASOPHILS NFR BLD AUTO: 0.4 %
BLACTX-M ISLT/SPM QL: ABNORMAL
BLAIMP ISLT/SPM QL: ABNORMAL
BLAKPC ISLT/SPM QL: ABNORMAL
BLAOXA-48-LIKE ISLT/SPM QL: ABNORMAL
BLAVIM ISLT/SPM QL: ABNORMAL
BOTTLE TYPE: ABNORMAL
BUN SERPL-MCNC: 11 MG/DL (ref 7–22)
C ALBICANS DNA BLD POS QL NAA+NON-PROBE: NOT DETECTED
C AURIS DNA BLD POS QL NAA+NON-PROBE: NOT DETECTED
C GATTII+NEOFOR DNA BLD POS QL NAA+N-PRB: NOT DETECTED
C GLABRATA DNA BLD POS QL NAA+NON-PROBE: NOT DETECTED
C KRUSEI DNA BLD POS QL NAA+NON-PROBE: NOT DETECTED
C PARAP DNA BLD POS QL NAA+NON-PROBE: NOT DETECTED
C TROPICLS DNA BLD POS QL NAA+NON-PROBE: NOT DETECTED
CALCIUM SERPL-MCNC: 8.7 MG/DL (ref 8.5–10.5)
CHLORIDE SERPL-SCNC: 104 MEQ/L (ref 98–111)
CO2 SERPL-SCNC: 24 MEQ/L (ref 23–33)
COAG NEG STAPH DNA BLD QL NAA+PROBE: NOT DETECTED
COLISTIN RES MCR-1 ISLT/SPM QL: ABNORMAL
CREAT SERPL-MCNC: 0.7 MG/DL (ref 0.4–1.2)
DEPRECATED RDW RBC AUTO: 45.1 FL (ref 35–45)
E CLOAC COMP DNA BLD POS NAA+NON-PROBE: NOT DETECTED
E COLI DNA BLD POS QL NAA+NON-PROBE: NOT DETECTED
E FAECALIS DNA BLD POS QL NAA+NON-PROBE: NOT DETECTED
E FAECIUM DNA BLD POS QL NAA+NON-PROBE: NOT DETECTED
EKG ATRIAL RATE: 82 BPM
EKG P AXIS: 36 DEGREES
EKG P-R INTERVAL: 188 MS
EKG Q-T INTERVAL: 350 MS
EKG QRS DURATION: 68 MS
EKG QTC CALCULATION (BAZETT): 408 MS
EKG R AXIS: -2 DEGREES
EKG T AXIS: 21 DEGREES
EKG VENTRICULAR RATE: 82 BPM
ENTEROBACTERALES DNA BLD POS NAA+N-PRB: NOT DETECTED
EOSINOPHIL NFR BLD AUTO: 0.7 %
EOSINOPHILS ABSOLUTE: 0.1 THOU/MM3 (ref 0–0.4)
ERYTHROCYTE [DISTWIDTH] IN BLOOD BY AUTOMATED COUNT: 13.6 % (ref 11.5–14.5)
GFR SERPL CREATININE-BSD FRML MDRD: > 90 ML/MIN/1.73M2
GLUCOSE SERPL-MCNC: 92 MG/DL (ref 70–108)
GP B STREP DNA SPEC QL NAA+PROBE: NOT DETECTED
GP B STREP DNA SPEC QL NAA+PROBE: NOT DETECTED
HAEM INFLU DNA BLD POS QL NAA+NON-PROBE: NOT DETECTED
HCT VFR BLD AUTO: 32.2 % (ref 37–47)
HGB BLD-MCNC: 10.3 GM/DL (ref 12–16)
IMM GRANULOCYTES # BLD AUTO: 0.16 THOU/MM3 (ref 0–0.07)
IMM GRANULOCYTES NFR BLD AUTO: 0.8 %
K OXYTOCA DNA BLD POS QL NAA+NON-PROBE: NOT DETECTED
K OXYTOCA DNA BLD POS QL NAA+NON-PROBE: NOT DETECTED
KLEBSIELLA SP DNA BLD POS QL NAA+NON-PRB: NOT DETECTED
L MONOCYTOG DNA BLD POS QL NAA+NON-PROBE: NOT DETECTED
LYMPHOCYTES ABSOLUTE: 1.6 THOU/MM3 (ref 1–4.8)
LYMPHOCYTES NFR BLD AUTO: 8.1 %
MAGNESIUM SERPL-MCNC: 2.1 MG/DL (ref 1.6–2.4)
MCH RBC QN AUTO: 28.7 PG (ref 26–33)
MCHC RBC AUTO-ENTMCNC: 32 GM/DL (ref 32.2–35.5)
MCV RBC AUTO: 89.7 FL (ref 81–99)
MECA ISLT/SPM QL: ABNORMAL
MECA+MECC+MREJ ISLT/SPM QL: ABNORMAL
MONOCYTES ABSOLUTE: 1.8 THOU/MM3 (ref 0.4–1.3)
MONOCYTES NFR BLD AUTO: 9.3 %
N MEN DNA BLD POS QL NAA+NON-PROBE: NOT DETECTED
NDM: ABNORMAL
NEUTROPHILS ABSOLUTE: 16 THOU/MM3 (ref 1.8–7.7)
NEUTROPHILS NFR BLD AUTO: 80.7 %
NRBC BLD AUTO-RTO: 0 /100 WBC
P AERUGINOSA DNA BLD POS NAA+NON-PROBE: NOT DETECTED
PLATELET # BLD AUTO: 359 THOU/MM3 (ref 130–400)
PMV BLD AUTO: 10.2 FL (ref 9.4–12.4)
POTASSIUM SERPL-SCNC: 4.6 MEQ/L (ref 3.5–5.2)
PROTEUS SPP: NOT DETECTED
RBC # BLD AUTO: 3.59 MILL/MM3 (ref 4.2–5.4)
S AUREUS DNA BLD POS QL NAA+NON-PROBE: NOT DETECTED
S EPIDERMIDIS DNA BLD POS QL NAA+NON-PRB: NOT DETECTED
S LUGDUNENSIS DNA BLD POS QL NAA+NON-PRB: NOT DETECTED
S MALTOPHILIA DNA BLD POS QL NAA+NON-PRB: NOT DETECTED
S MARCESCENS DNA BLD POS NAA+NON-PROBE: NOT DETECTED
S PYO DNA THROAT QL NAA+PROBE: NOT DETECTED
SALMONELLA DNA BLD POS QL NAA+NON-PROBE: NOT DETECTED
SODIUM SERPL-SCNC: 140 MEQ/L (ref 135–145)
SOURCE OF BLOOD CULTURE: ABNORMAL
STREPTOCOCCUS DNA BLD QL NAA+PROBE: DETECTED
VANA+VANB ISLT/SPM QL: ABNORMAL
WBC # BLD AUTO: 19.8 THOU/MM3 (ref 4.8–10.8)

## 2024-09-01 PROCEDURE — 83735 ASSAY OF MAGNESIUM: CPT

## 2024-09-01 PROCEDURE — 85025 COMPLETE CBC W/AUTO DIFF WBC: CPT

## 2024-09-01 PROCEDURE — 74183 MRI ABD W/O CNTR FLWD CNTR: CPT

## 2024-09-01 PROCEDURE — 93010 ELECTROCARDIOGRAM REPORT: CPT | Performed by: NUCLEAR MEDICINE

## 2024-09-01 PROCEDURE — 99233 SBSQ HOSP IP/OBS HIGH 50: CPT | Performed by: FAMILY MEDICINE

## 2024-09-01 PROCEDURE — 6370000000 HC RX 637 (ALT 250 FOR IP)

## 2024-09-01 PROCEDURE — 6360000002 HC RX W HCPCS

## 2024-09-01 PROCEDURE — 36415 COLL VENOUS BLD VENIPUNCTURE: CPT

## 2024-09-01 PROCEDURE — 2580000003 HC RX 258

## 2024-09-01 PROCEDURE — 80048 BASIC METABOLIC PNL TOTAL CA: CPT

## 2024-09-01 PROCEDURE — 1200000003 HC TELEMETRY R&B

## 2024-09-01 PROCEDURE — A9579 GAD-BASE MR CONTRAST NOS,1ML: HCPCS

## 2024-09-01 PROCEDURE — 6360000004 HC RX CONTRAST MEDICATION

## 2024-09-01 PROCEDURE — 93005 ELECTROCARDIOGRAM TRACING: CPT

## 2024-09-01 RX ORDER — IBUPROFEN 600 MG/1
600 TABLET, FILM COATED ORAL ONCE
Status: DISCONTINUED | OUTPATIENT
Start: 2024-09-01 | End: 2024-09-02

## 2024-09-01 RX ORDER — ACETAMINOPHEN 500 MG
500 TABLET ORAL ONCE
Status: DISCONTINUED | OUTPATIENT
Start: 2024-09-01 | End: 2024-09-06 | Stop reason: HOSPADM

## 2024-09-01 RX ADMIN — METRONIDAZOLE 500 MG: 500 INJECTION, SOLUTION INTRAVENOUS at 15:26

## 2024-09-01 RX ADMIN — ACETAMINOPHEN 650 MG: 325 TABLET ORAL at 04:37

## 2024-09-01 RX ADMIN — METRONIDAZOLE 500 MG: 500 INJECTION, SOLUTION INTRAVENOUS at 23:18

## 2024-09-01 RX ADMIN — DILTIAZEM HYDROCHLORIDE 120 MG: 60 CAPSULE, EXTENDED RELEASE ORAL at 21:39

## 2024-09-01 RX ADMIN — ACETAMINOPHEN 650 MG: 325 TABLET ORAL at 15:21

## 2024-09-01 RX ADMIN — GABAPENTIN 300 MG: 300 CAPSULE ORAL at 08:10

## 2024-09-01 RX ADMIN — GADOTERIDOL 15 ML: 279.3 INJECTION, SOLUTION INTRAVENOUS at 12:48

## 2024-09-01 RX ADMIN — PANTOPRAZOLE SODIUM 40 MG: 40 TABLET, DELAYED RELEASE ORAL at 08:09

## 2024-09-01 RX ADMIN — GABAPENTIN 600 MG: 300 CAPSULE ORAL at 21:39

## 2024-09-01 RX ADMIN — METRONIDAZOLE 500 MG: 500 INJECTION, SOLUTION INTRAVENOUS at 06:13

## 2024-09-01 RX ADMIN — CEFTRIAXONE SODIUM 2000 MG: 2 INJECTION, POWDER, FOR SOLUTION INTRAMUSCULAR; INTRAVENOUS at 21:46

## 2024-09-01 ASSESSMENT — PAIN SCALES - GENERAL: PAINLEVEL_OUTOF10: 2

## 2024-09-01 ASSESSMENT — PAIN DESCRIPTION - PAIN TYPE: TYPE: ACUTE PAIN

## 2024-09-01 ASSESSMENT — PAIN DESCRIPTION - DESCRIPTORS: DESCRIPTORS: JABBING

## 2024-09-01 ASSESSMENT — PAIN DESCRIPTION - FREQUENCY: FREQUENCY: INTERMITTENT

## 2024-09-01 ASSESSMENT — PAIN - FUNCTIONAL ASSESSMENT: PAIN_FUNCTIONAL_ASSESSMENT: ACTIVITIES ARE NOT PREVENTED

## 2024-09-01 ASSESSMENT — PAIN DESCRIPTION - ONSET: ONSET: ON-GOING

## 2024-09-01 ASSESSMENT — PAIN DESCRIPTION - LOCATION: LOCATION: ABDOMEN

## 2024-09-01 ASSESSMENT — PAIN DESCRIPTION - ORIENTATION: ORIENTATION: UPPER

## 2024-09-01 NOTE — PLAN OF CARE
Problem: Discharge Planning  Goal: Discharge to home or other facility with appropriate resources  9/1/2024 1215 by Derek Marsh, RN  Outcome: Progressing  Flowsheets (Taken 9/1/2024 1215)  Discharge to home or other facility with appropriate resources: Identify barriers to discharge with patient and caregiver  9/1/2024 0312 by Dickson Diaz RN  Outcome: Progressing     Problem: Pain  Goal: Verbalizes/displays adequate comfort level or baseline comfort level  9/1/2024 1215 by Derek Marsh, RN  Outcome: Progressing  Flowsheets (Taken 9/1/2024 1215)  Verbalizes/displays adequate comfort level or baseline comfort level: Encourage patient to monitor pain and request assistance  9/1/2024 0312 by Dickson Diaz RN  Outcome: Progressing     Problem: Safety - Adult  Goal: Free from fall injury  9/1/2024 1215 by Derek Marsh, RN  Outcome: Progressing  Flowsheets (Taken 9/1/2024 1215)  Free From Fall Injury: Instruct family/caregiver on patient safety  9/1/2024 0312 by Dickson Diaz RN  Outcome: Progressing

## 2024-09-01 NOTE — H&P
Hospitalist History & Physical         Patient: Leena Zuñiga 63 y.o. female      : 1960  Date of Admission: 2024  Date of Service: Pt seen/examined on 24 and Admitted to Inpatient with expected LOS greater than two midnights due to medical therapy.         ASSESSMENT AND PLAN    Acute hypoxia likely 2/2 CAP, POA: Noted to have pulse ox reading of 88% on room air at OSH. Denies cough or sputum production.  Start on Ceftriaxone 2g daily for now  Good pulmonary hygiene  Titrate oxygen as able to maintain SPO2 92% or greater    Sepsis, from unclear origin: Noted T 101.7, , RR 16, WBC 19.5.   Continue Ceftriaxone and Metronidazole  Blood cultures pending  Pneumonia panel pending  Inflammatory markers pending    Epigastric pain likely 2/2 #3 vs #1: patient endorse epigastric pain that is now radiating to LUQ and rates the pain a 7/10 at this time and is sharp in nature.   Multimodal pain management    Liver lesion likely 2/2 abscess vs neoplasm :   Will request IR to evaluate for possible drain  Will cover for intraabdominal abscess as well. Continue Ceftriaxone 2 g daily and add Metronidazole 500 mg q8h  Consult to general surgery for possible intervention  Consider General surgery consult pending clinical course    UTI, POA: UA showed Many bacteria, small leukocyte esterase and positive nitrites. Already covered with Ceftriaxone    HAMMAD: creatinine elevated to 1.1, which is above baseline of 0.7. Likely 2/2 decreased PO intake  Strict I/O  Daily weights  Urinary indices pending    Chronic Conditions (reviewed and stable unless otherwise stated)   Neuropathy: continue home neurontin  GERD: resume home PPI  Noted history of A-fib s/p ablation      Data reviewed (unless otherwise discussed in assessment/plan)  EKG:  I have reviewed the EKG with the following interpretation: Pending  Imaging: I have reviewed CT A/P with the following interpretation: Hypoattenuating lesion with adjacent  non-distended with normal bowel sounds.  Musculoskeletal: No joint swelling or tenderness. Normal tone. No abnormal movements.   Skin: Warm and dry. No rashes or lesions.  Neurologic:  No focal sensory/motor deficits in the upper and lower extremities. Cranial nerves:  grossly non-focal 2-12.     Psychiatric: Alert and oriented, normal insight and thought content.   Capillary Refill: Brisk,< 3 seconds.  Peripheral Pulses: +2 palpable, equal bilaterally.       Medications Prior to Admission:   Prior to Admission Medications   Prescriptions Last Dose Informant Patient Reported? Taking?   Cyanocobalamin (B-12) 3000 MCG LOZG 8/31/2024 at 0900  Yes Yes   Handicap Placard MISC   No No   Sig: by Does not apply route x5 years   ascorbic acid (VITAMIN C) 500 MG tablet Not Taking  Yes No   Sig: Take 2 tablets by mouth every morning   Patient not taking: Reported on 8/31/2024   aspirin 81 MG tablet 8/31/2024 at 0900  Yes Yes   Sig: Take 1 tablet by mouth daily   dilTIAZem (CARDIZEM 12 HR) 120 MG extended release capsule 8/31/2024 at 0900  No Yes   Sig: Take 1 capsule by mouth 2 times daily   gabapentin (NEURONTIN) 300 MG capsule 8/31/2024 at 0900  No Yes   Sig: take 1 capsule by mouth every morning then 1 capsule IN THE AFTERNOON and 2 capsules at bedtime   meloxicam (MOBIC) 15 MG tablet 8/31/2024 at 0900  No Yes   Sig: Take 1 tablet by mouth daily   pantoprazole (PROTONIX) 40 MG tablet 8/31/2024 at 0900  No Yes   Sig: Take 1 tablet by mouth daily   predniSONE (DELTASONE) 10 MG tablet Not Taking  No No   Sig: Take 5 tablets by mouth daily for 2 days, THEN 4 tablets daily for 2 days, THEN 3 tablets daily for 2 days, THEN 2 tablets daily for 2 days, THEN 1 tablet daily for 2 days.   Patient not taking: Reported on 8/31/2024   vitamin D (CHOLECALCIFEROL) 50 MCG (2000 UT) CAPS capsule Not Taking  Yes No   Sig: Take 1 capsule by mouth every morning   Patient not taking: Reported on 8/21/2024      Facility-Administered Medications:

## 2024-09-01 NOTE — PROGRESS NOTES
PROGRESS NOTE      [facility]  Unit[facility]05/005-A  YOB: 1960  MRN: [id number]   Acct: [id number]    PCP: Caryn Damon APRN - CNP    Date of Admission: 8/31/2024 LOS: 1    Date of Evaluation:  9/1/2024    Anticipated Discharge: Pending clinical status/evaluation of infection    Assessment/Plan:    Acute hypoxic respiratory failure  -Episode of 88% on room air SpO2 in the ER, started on 2 L O2 via NC  -Likely related to bilateral lower lung atelectasis/infiltrate  -Continue ceftriaxone 2 g daily  -Weaned O2 today, no longer on supplemental O2    Sepsis, unclear origin  Elevated lactic acid  -SIRS 3/4, she is febrile, tachycardic, WBC 19.5 9/1  -Investigating etiology, b/L lower lung atelectasis/infiltrate, confirmed UTI, possible liver abscess  -Lactate 0.62 8/31  -Blood cultures, pneumonia panel, inflammatory markers pending  -MRI MRCP with and without ordered  -Since she is spiking fevers, will consider redoing infectious workup in the a.m. 9/2    UTI  -UTI confirmed by urinalysis, patient denies dysuria, suprapubic pain, flank pain, urinary frequency.  -Urine culture pending, positive for gram-negative bacilli  -On Rocephin 2 g IV  -Will consider restarting maintenance IV fluids in the a.m. 9/2  -Possible infectious etiology, will continue to investigate alternate etiologies    Bilateral lower lung atelectasis/infiltrate  -Noted on CXR 8/31, patient endorses lower left thoracic pain  -On Rocephin 2 g IV (started 8/31, ending 9/4)  -Pneumonia panel pending, sputum culture pending  -Has not been tachypneic on 9/1, will continue to monitor SpO2 and symptoms    Lower left hepatic lobe lesions  Elevated alk phos  -MRI with and without MRCP ordered  -MRI 9/1 showed 3 x 2.1 cm probable hemangioma in the dome of the right lobe of the liver, 4.8 x 3.6 cm and 3.1 x 3.1 cm areas in the left lobe of the liver with abnormal enhancement, metastatic disease cannot be ruled out.  -Elevated

## 2024-09-01 NOTE — PROCEDURES
PROCEDURE NOTE  Date: 9/1/2024   Name: Leena Zuñiga  YOB: 1960    Procedures  12 lead EKG completed. Results handed to Dickson ABERNATHY.

## 2024-09-02 LAB
ALBUMIN SERPL BCG-MCNC: 2.8 G/DL (ref 3.5–5.1)
ALP SERPL-CCNC: 180 U/L (ref 38–126)
ALT SERPL W/O P-5'-P-CCNC: 12 U/L (ref 11–66)
ANION GAP SERPL CALC-SCNC: 11 MEQ/L (ref 8–16)
AST SERPL-CCNC: 14 U/L (ref 5–40)
BACTERIA UR CULT: ABNORMAL
BASOPHILS ABSOLUTE: 0.1 THOU/MM3 (ref 0–0.1)
BASOPHILS NFR BLD AUTO: 0.4 %
BILIRUB SERPL-MCNC: 0.2 MG/DL (ref 0.3–1.2)
BUN SERPL-MCNC: 10 MG/DL (ref 7–22)
CALCIUM SERPL-MCNC: 8.7 MG/DL (ref 8.5–10.5)
CHLORIDE SERPL-SCNC: 105 MEQ/L (ref 98–111)
CO2 SERPL-SCNC: 24 MEQ/L (ref 23–33)
CREAT SERPL-MCNC: 0.7 MG/DL (ref 0.4–1.2)
DEPRECATED RDW RBC AUTO: 44.3 FL (ref 35–45)
EOSINOPHIL NFR BLD AUTO: 0.4 %
EOSINOPHILS ABSOLUTE: 0.1 THOU/MM3 (ref 0–0.4)
ERYTHROCYTE [DISTWIDTH] IN BLOOD BY AUTOMATED COUNT: 13.6 % (ref 11.5–14.5)
GFR SERPL CREATININE-BSD FRML MDRD: > 90 ML/MIN/1.73M2
GLUCOSE SERPL-MCNC: 109 MG/DL (ref 70–108)
HCT VFR BLD AUTO: 31.6 % (ref 37–47)
HGB BLD-MCNC: 10.3 GM/DL (ref 12–16)
IMM GRANULOCYTES # BLD AUTO: 0.13 THOU/MM3 (ref 0–0.07)
IMM GRANULOCYTES NFR BLD AUTO: 0.8 %
LACTATE SERPL-SCNC: 0.6 MMOL/L (ref 0.5–2)
LYMPHOCYTES ABSOLUTE: 2.2 THOU/MM3 (ref 1–4.8)
LYMPHOCYTES NFR BLD AUTO: 13.4 %
MAGNESIUM SERPL-MCNC: 1.9 MG/DL (ref 1.6–2.4)
MCH RBC QN AUTO: 28.9 PG (ref 26–33)
MCHC RBC AUTO-ENTMCNC: 32.6 GM/DL (ref 32.2–35.5)
MCV RBC AUTO: 88.5 FL (ref 81–99)
MONOCYTES ABSOLUTE: 1.5 THOU/MM3 (ref 0.4–1.3)
MONOCYTES NFR BLD AUTO: 9.5 %
NEUTROPHILS ABSOLUTE: 12.2 THOU/MM3 (ref 1.8–7.7)
NEUTROPHILS NFR BLD AUTO: 75.5 %
NRBC BLD AUTO-RTO: 0 /100 WBC
ORGANISM: ABNORMAL
PLATELET # BLD AUTO: 327 THOU/MM3 (ref 130–400)
PMV BLD AUTO: 9.8 FL (ref 9.4–12.4)
POTASSIUM SERPL-SCNC: 4.1 MEQ/L (ref 3.5–5.2)
PROT SERPL-MCNC: 6.3 G/DL (ref 6.1–8)
RBC # BLD AUTO: 3.57 MILL/MM3 (ref 4.2–5.4)
SODIUM SERPL-SCNC: 140 MEQ/L (ref 135–145)
WBC # BLD AUTO: 16.1 THOU/MM3 (ref 4.8–10.8)

## 2024-09-02 PROCEDURE — 36415 COLL VENOUS BLD VENIPUNCTURE: CPT

## 2024-09-02 PROCEDURE — 6360000002 HC RX W HCPCS

## 2024-09-02 PROCEDURE — 83605 ASSAY OF LACTIC ACID: CPT

## 2024-09-02 PROCEDURE — 99223 1ST HOSP IP/OBS HIGH 75: CPT | Performed by: STUDENT IN AN ORGANIZED HEALTH CARE EDUCATION/TRAINING PROGRAM

## 2024-09-02 PROCEDURE — 1200000003 HC TELEMETRY R&B

## 2024-09-02 PROCEDURE — 85025 COMPLETE CBC W/AUTO DIFF WBC: CPT

## 2024-09-02 PROCEDURE — 82105 ALPHA-FETOPROTEIN SERUM: CPT

## 2024-09-02 PROCEDURE — 83735 ASSAY OF MAGNESIUM: CPT

## 2024-09-02 PROCEDURE — 87040 BLOOD CULTURE FOR BACTERIA: CPT

## 2024-09-02 PROCEDURE — 2580000003 HC RX 258

## 2024-09-02 PROCEDURE — 6370000000 HC RX 637 (ALT 250 FOR IP)

## 2024-09-02 PROCEDURE — 80053 COMPREHEN METABOLIC PANEL: CPT

## 2024-09-02 PROCEDURE — 99233 SBSQ HOSP IP/OBS HIGH 50: CPT | Performed by: FAMILY MEDICINE

## 2024-09-02 RX ADMIN — ACETAMINOPHEN 650 MG: 325 TABLET ORAL at 03:20

## 2024-09-02 RX ADMIN — SODIUM CHLORIDE, PRESERVATIVE FREE 10 ML: 5 INJECTION INTRAVENOUS at 21:13

## 2024-09-02 RX ADMIN — GABAPENTIN 300 MG: 300 CAPSULE ORAL at 07:53

## 2024-09-02 RX ADMIN — ACETAMINOPHEN 650 MG: 325 TABLET ORAL at 15:28

## 2024-09-02 RX ADMIN — CEFTRIAXONE SODIUM 2000 MG: 2 INJECTION, POWDER, FOR SOLUTION INTRAMUSCULAR; INTRAVENOUS at 22:32

## 2024-09-02 RX ADMIN — DILTIAZEM HYDROCHLORIDE 120 MG: 60 CAPSULE, EXTENDED RELEASE ORAL at 07:53

## 2024-09-02 RX ADMIN — METRONIDAZOLE 500 MG: 500 INJECTION, SOLUTION INTRAVENOUS at 22:29

## 2024-09-02 RX ADMIN — METRONIDAZOLE 500 MG: 500 INJECTION, SOLUTION INTRAVENOUS at 15:32

## 2024-09-02 RX ADMIN — SODIUM CHLORIDE, PRESERVATIVE FREE 10 ML: 5 INJECTION INTRAVENOUS at 07:55

## 2024-09-02 RX ADMIN — PANTOPRAZOLE SODIUM 40 MG: 40 TABLET, DELAYED RELEASE ORAL at 07:54

## 2024-09-02 RX ADMIN — METRONIDAZOLE 500 MG: 500 INJECTION, SOLUTION INTRAVENOUS at 06:17

## 2024-09-02 RX ADMIN — DILTIAZEM HYDROCHLORIDE 120 MG: 60 CAPSULE, EXTENDED RELEASE ORAL at 21:13

## 2024-09-02 RX ADMIN — GABAPENTIN 600 MG: 300 CAPSULE ORAL at 21:13

## 2024-09-02 ASSESSMENT — PAIN - FUNCTIONAL ASSESSMENT: PAIN_FUNCTIONAL_ASSESSMENT: ACTIVITIES ARE NOT PREVENTED

## 2024-09-02 ASSESSMENT — PAIN DESCRIPTION - ONSET: ONSET: ON-GOING

## 2024-09-02 ASSESSMENT — PAIN DESCRIPTION - DESCRIPTORS: DESCRIPTORS: TIGHTNESS

## 2024-09-02 ASSESSMENT — PAIN DESCRIPTION - ORIENTATION: ORIENTATION: ANTERIOR

## 2024-09-02 ASSESSMENT — PAIN DESCRIPTION - LOCATION: LOCATION: HEAD

## 2024-09-02 ASSESSMENT — PAIN DESCRIPTION - PAIN TYPE: TYPE: ACUTE PAIN

## 2024-09-02 ASSESSMENT — PAIN DESCRIPTION - FREQUENCY: FREQUENCY: INTERMITTENT

## 2024-09-02 ASSESSMENT — PAIN SCALES - GENERAL: PAINLEVEL_OUTOF10: 5

## 2024-09-02 NOTE — PLAN OF CARE
Problem: Discharge Planning  Goal: Discharge to home or other facility with appropriate resources  Outcome: Progressing  Flowsheets (Taken 9/1/2024 1925 by Radha Schwarz, RN)  Discharge to home or other facility with appropriate resources: Identify barriers to discharge with patient and caregiver     Problem: Pain  Goal: Verbalizes/displays adequate comfort level or baseline comfort level  Outcome: Progressing  Flowsheets (Taken 9/1/2024 1215)  Verbalizes/displays adequate comfort level or baseline comfort level: Encourage patient to monitor pain and request assistance     Problem: Safety - Adult  Goal: Free from fall injury  Outcome: Progressing  Flowsheets (Taken 9/1/2024 1215)  Free From Fall Injury: Instruct family/caregiver on patient safety

## 2024-09-02 NOTE — CONSULTS
Hx and P/E :Infectious D.       Patient - Leena Zuñiga,  Age - 63 y.o.    - 1960      Room Number - 6K-05/005-A   MRN -  [id number]   [facility] # - [id number]  Date of Admission -  2024  4:22 PM  Patient's PCP: Caryn Damon APRN - CNP     Requesting Physician: Sherrie Tesfaye MD    CHIEF COMPLAINT:     Fever    ASSESSMENT AND PLAN     Pt is a 63yof I am consulted for strep septicemia in setting of liver lesion and epigastric pain. Currently on LEBLANC and CTX.    The typical etiologies of strep infections are dental in nature with translocation, cutaneous spread, primary GI disease (colonization in GI tract). She does have epigastric pain that appears c/w a potential gastric ulcer (not improved with pepcid). She also has hepatic enhancement of unclear etiology (?malignancy, benign mass or infection).     If it is infection, hepatic abscess tends to be polymicrobial. I will follow the blood culture results for further identification of strep species. Based on this, will decide on possible TTE (HANDOC score). Ideally, sampling of the hepatic lesion would be beneficial to confirm this is infectious but will defer to IR. ID to follow.      - repeat blood cx today, given unclear source of infection  - Cont CTX and LEBLANC  - Duration tbd  - Ideally, attempt sampling of liver lesion if able  - Consideration for GI evaluation with suspicion of gastric ulcer (epigastric pain)    HISTORY OF PRESENT ILLNESS       This is a very pleasant 63 y.o. female who was admitted to the hospital with a chief complaints of fever. ID consulted for strep bacteremia.     Patient presented on  with fevers and epigastric discomfort. Shoe noted radiation to the luq. Fever was noted to 102. She states that the epigastric discomfort has been ongoing for 3 weeks. She has been on pepcid without improvement. She otherwise has not had any other significant symptoms.     On arrival, patient was noted be febrile.  reports current alcohol use.        FAMILY HISTORY:         Problem Relation Age of Onset    Hypertension Mother     Hypertension Father     Cancer Sister         thyroid       REVIEW OF SYSTEMS:     Constitutional: no fever, no night sweats, no fatigue.  Head: no head ache , no head injury, no migranes.  Eye: no blurring of vision, no double vision.  Ears: no hearing difficulty, no tinnitus  Mouth/throat: no ulceration, dental caries , dysphagia  Lungs: no cough, no shortness of breath, no wheeze  CVS: no palpitation, no chest pain, no shortness of breath  GI: no abdominal pain, no nausea , no vomiting, no constipation  SHIRAZ: no dysuria, frequency and urgency, no hematuria, no kidney stones  Musculoskeletal: no joint pain, swelling , stiffness,  Endocrine: no polyuria, polydipsia, no cold or heat intolerance  Hematology: no anemia, no easy brusing or bleeding, no hx of clotting disorder  Dermatology: no skin rash, no eczema, no pruritis,  Psychiatry: no depression, no anxiety,no panic attacks, no suicide ideation    PHYSICAL EXAM:     /70   Pulse 73   Temp 99.1 °F (37.3 °C) (Oral)   Resp 16   Ht 1.575 m (5' 2\")   Wt 78.8 kg (173 lb 11.6 oz)   SpO2 95%   BMI 31.77 kg/m²   General:  Awake, alert, not in distress.  HEENT: pink conjunctiva, unicteric sclera, moist oral mucosa.  Chest:  bilateral air entry, Clear to auscultation,   Cardiovascular:  RRR ,S1S2, no murmur or gallop.  Abdomen:  Soft, non tender to palpation.  Extremities: no edema  Skin:  Warm and dry.  CNS: aox3        LABS:     CBC:   Recent Labs     08/31/24  1635 09/01/24  0601 09/02/24  0546   WBC 19.5* 19.8* 16.1*   HGB 11.8* 10.3* 10.3*   * 359 327     BMP:    Recent Labs     08/31/24  1635 09/01/24  0601 09/02/24  0546    140 140   K 4.0 4.6 4.1   CL 98 104 105   CO2 27 24 24   BUN 14 11 10   CREATININE 1.1 0.7 0.7   GLUCOSE 116* 92 109*     Calcium:  Recent Labs     09/02/24  0546   CALCIUM 8.7     Ionized Calcium:Invalid

## 2024-09-02 NOTE — CONSULTS
Pt Name: Leena Zuñiga  MRN: [id number]  [id number]  YOB: 1960  Admit Date: 8/31/2024  4:22 PM  Date of evaluation: 9/2/2024  Primary Care Physician: Caryn Damon APRN - CNP   6K-05/005-A     Requesting Provider:  River Lorenzo DO     GI Consult    Indication:  LUQ pain    History:  The patient is a 63 y.o. female admitted 8/31/24 for leukocytosis, fever, malaise, dyspnea due to CAP.  Imaging demonstrates liver masses and ^LFT's.  Pt on Meloxicam for DDD.  She was started on ppi 2 weeks ago by [facility] ED.  She has early satiety and anorexia.    Location:  epigastric/LUQ  Duration:  2 months  Radiation:  RUQ  Associated:  malaise, SOB, early satiety and anorexia  Mitigating factors:  none  Exacerbating factors:  exertion    Last EGD:  never  Last Colonoscopy:  never    Past Medical History:        Diagnosis Date    Atrial fibrillation (HCC)      Past Surgical History:        Procedure Laterality Date    ABLATION OF DYSRHYTHMIC FOCUS      CARPAL TUNNEL RELEASE Bilateral 2012    HYSTERECTOMY, VAGINAL  2008    NECK SURGERY  06/01/2022    C5-C7     Allergies:  Duloxetine hcl  Home Meds:  Prior to Admission medications    Medication Sig Start Date End Date Taking? Authorizing Provider   dilTIAZem (CARDIZEM 12 HR) 120 MG extended release capsule Take 1 capsule by mouth 2 times daily 8/21/24 8/16/25 Yes Caryn Damon APRN - CNP   meloxicam (MOBIC) 15 MG tablet Take 1 tablet by mouth daily 8/21/24  Yes Caryn Damon APRN - CNP   Cyanocobalamin (B-12) 3000 MCG LOZG  8/6/24  Yes ProviderMor MD   gabapentin (NEURONTIN) 300 MG capsule take 1 capsule by mouth every morning then 1 capsule IN THE AFTERNOON and 2 capsules at bedtime 8/21/24 11/19/24 Yes Caryn Damon APRN - CNP   pantoprazole (PROTONIX) 40 MG tablet Take 1 tablet by mouth daily 8/21/24 9/20/24 Yes Caryn Damon APRN - CNP   aspirin 81 MG tablet Take 1 tablet by mouth daily   Yes ProviderMor MD   predniSONE  Narrative    Not on file     Social Determinants of Health     Financial Resource Strain: Low Risk  (2/9/2022)    Overall Financial Resource Strain (CARDIA)     Difficulty of Paying Living Expenses: Not hard at all   Food Insecurity: No Food Insecurity (8/31/2024)    Hunger Vital Sign     Worried About Running Out of Food in the Last Year: Never true     Ran Out of Food in the Last Year: Never true   Transportation Needs: No Transportation Needs (8/31/2024)    PRAPARE - Transportation     Lack of Transportation (Medical): No     Lack of Transportation (Non-Medical): No   Physical Activity: Not on file   Stress: Not on file   Social Connections: Not on file   Intimate Partner Violence: Not on file   Housing Stability: Low Risk  (8/31/2024)    Housing Stability Vital Sign     Unable to Pay for Housing in the Last Year: No     Number of Times Moved in the Last Year: 1     Homeless in the Last Year: No     Family History:   No GI malignancies     ROS:  GENERAL: No fever or chills.   NEURO: No headache or seizure.   EYES: No diplopia or vision loss.   CARDIOVASCULAR: No chest pain or palpitations.   RESPIRATORY: No dyspnea or cough.   GI: NO melena.  NO hematochezia   : No dysuria or hematuria.   GYN: No discharge or abnormal bleeding.    MUSCULOSKELETAL: No new arthralgias or myalgias  DERM: No rash or jaundice.   ENDOCRINE: No polyuria or polydipsia.   PSYCH: No anxiety or depression.    Physical Exam:  VITALS: /81   Pulse 78   Temp 99.6 °F (37.6 °C) (Oral)   Resp 16   Ht 1.575 m (5' 2\")   Wt 78.8 kg (173 lb 11.6 oz)   SpO2 92%   BMI 31.77 kg/m²   The patient is a 63 y.o. female with Body mass index is 31.77 kg/m². in no acute distress.  HEAD: Normal cephalic/atraumatic. Extra-occular motions intact bilaterally.  NECK: No lymphadenopathy or bruits.  CHEST: Rises equally on inspiration. Clear to auscultation bilaterally.   CARDIOVASCULAR: Regular rate and rhythm without murmurs, rubs or gallops.    ABDOMEN: Soft and nondistended with normal bowel sounds. No Hepatosplemomegaly.   Tender:  epigastric  UPPER EXTREMITIES: no cyanosis, clubbing, or edema.  DERM: no rash or jaundice.  LOWER EXTREMITIES: no cyanosis, clubbing, or edema.  NEURO: Alert and oriented times four. Patient moves all extremities and has gross sensation in all extremities.    Assessment:    Epigastric/LUQ pain  Early satiety and anorexia    Anemia, normo  Meloxicam use    2 Liver lesion 4x4 and 3x3 liver lesions  Separate liver hemangioma  ^ALP  Hypoalbuminemia    Left nephrolithiasis  DDD - on Meloxicam use    Dyspnea  CAP - on rocephin  Hypoxia, improved with O2    UTI     Plan:    EGD to eval EP/LUQ pain, early satiety, and anorexia and anemia, average risk    Jameson Fields MD  1:20 PM 9/2/2024

## 2024-09-02 NOTE — PROGRESS NOTES
PROGRESS NOTE      Patient:  Leena King  Unit/Bed:[facility]/005-A  YOB: 1960  MRN: [id number]   Acct: [id number]    PCP: Caryn Damon APRN - CNP    Date of Admission: 8/31/2024 LOS: 2    Date of Evaluation:  9/2/2024    Anticipated Discharge: Pending clinical status/evaluation of infection    Assessment/Plan:    Acute hypoxic respiratory failure, improved  -Episode of 88% on room air SpO2 in the ER, started on 2lpm O2 via NC  -Likely related to bilateral lower lung atelectasis/infiltrate  -Continue ceftriaxone 2 g daily  -Weaned O2 today, no longer on supplemental O2    Sepsis with leukocytosis, tachycardia, lactic acidosis, fever with gram (+) bacteremia (prelim strep species) with uncertain etiology-likely UTI, possible liver abscess  -SIRS 3/4, with fever, tachycardia, leukocytosis on arrival 19.5 (8/31)  -Investigating etiology, b/L lower lung atelectasis/infiltrate, confirmed UTI, possible liver abscess  -Lactate 0.62 (8/31), repeat lactic acid 0.6 on (9/2)  -Inflammatory markers (ESR 90 and CRP 25.75) elevated 8/31  -Blood cultures x2 on arrival positive for strep species   -MRI 9/1 showed 3 x 2.1 cm probable hemangioma in the dome of the right lobe of the liver, 4.8 x 3.6 cm and 3.1 x 3.1 cm areas in the left lobe of the liver with abnormal enhancement, metastatic disease cannot be ruled out.  -MRI recommends CT guided biopsy if she has elevated LFTs, alk phos elevated 180 on 9/2  - ?Abscess versus malignancy versus disseminated strep  - Checking AFP, in process  - ID consulted, appreciate recommendations  - Continue Rocephin 2 g IV and metronidazole 500 mg IV  - Repeating blood cultures x2   - IR procedure ordered, will consult in the a.m. 9/3    UTI with E. Coli  -UTI confirmed by urinalysis, patient denies dysuria, suprapubic pain, flank pain, urinary frequency.  -Urine culture positive for E. coli  -On Rocephin 2 g IV  -Possible infectious etiology, will continue to investigate

## 2024-09-02 NOTE — PROGRESS NOTES
1347 Order received for CT guided liver biopsy. Chart reviewed. Pt will be scheduled for liver biopsy on 9-3-24 at 0830. Pt is to be NPO 4 hours prior to procedure. Hold blood thinners. Consent for procedure will be obtained by the IR staff upon pt's arrival to the department. These and any additional orders will be placed as SIGNED and HELD ORDERS by the radiologist later today/this evening. They will need to be released and initiated by the NIGHT SHIFT staff after midnight. Pt's nurse was notified of the above and verbalized understanding.

## 2024-09-02 NOTE — PLAN OF CARE
Problem: Discharge Planning  Goal: Discharge to home or other facility with appropriate resources  9/1/2024 2226 by Radha Schwarz RN  Outcome: Progressing  Flowsheets (Taken 9/1/2024 1925)  Discharge to home or other facility with appropriate resources: Identify barriers to discharge with patient and caregiver  9/1/2024 1215 by Derek Marsh RN  Outcome: Progressing  Flowsheets (Taken 9/1/2024 1215)  Discharge to home or other facility with appropriate resources: Identify barriers to discharge with patient and caregiver     Problem: Pain  Goal: Verbalizes/displays adequate comfort level or baseline comfort level  9/1/2024 2226 by Radha Schwarz RN  Outcome: Progressing  9/1/2024 1215 by Derek Marsh RN  Outcome: Progressing  Flowsheets (Taken 9/1/2024 1215)  Verbalizes/displays adequate comfort level or baseline comfort level: Encourage patient to monitor pain and request assistance     Problem: Safety - Adult  Goal: Free from fall injury  9/1/2024 2226 by Radha Schwarz RN  Outcome: Progressing  9/1/2024 1215 by Derek Marsh RN  Outcome: Progressing  Flowsheets (Taken 9/1/2024 1215)  Free From Fall Injury: Instruct family/caregiver on patient safety

## 2024-09-03 ENCOUNTER — APPOINTMENT (OUTPATIENT)
Dept: CT IMAGING | Age: 64
DRG: 871 | End: 2024-09-03
Payer: COMMERCIAL

## 2024-09-03 LAB
ALBUMIN SERPL BCG-MCNC: 2.9 G/DL (ref 3.5–5.1)
ALP SERPL-CCNC: 164 U/L (ref 38–126)
ALT SERPL W/O P-5'-P-CCNC: 10 U/L (ref 11–66)
ANION GAP SERPL CALC-SCNC: 13 MEQ/L (ref 8–16)
AST SERPL-CCNC: 11 U/L (ref 5–40)
BASOPHILS ABSOLUTE: 0.1 THOU/MM3 (ref 0–0.1)
BASOPHILS NFR BLD AUTO: 0.4 %
BILIRUB SERPL-MCNC: 0.3 MG/DL (ref 0.3–1.2)
BUN SERPL-MCNC: 11 MG/DL (ref 7–22)
CALCIUM SERPL-MCNC: 8.9 MG/DL (ref 8.5–10.5)
CHLORIDE SERPL-SCNC: 104 MEQ/L (ref 98–111)
CO2 SERPL-SCNC: 23 MEQ/L (ref 23–33)
CREAT SERPL-MCNC: 0.6 MG/DL (ref 0.4–1.2)
DEPRECATED RDW RBC AUTO: 43.6 FL (ref 35–45)
EOSINOPHIL NFR BLD AUTO: 0.6 %
EOSINOPHILS ABSOLUTE: 0.1 THOU/MM3 (ref 0–0.4)
ERYTHROCYTE [DISTWIDTH] IN BLOOD BY AUTOMATED COUNT: 13.7 % (ref 11.5–14.5)
FERRITIN SERPL IA-MCNC: 517 NG/ML (ref 10–291)
GFR SERPL CREATININE-BSD FRML MDRD: > 90 ML/MIN/1.73M2
GLUCOSE SERPL-MCNC: 88 MG/DL (ref 70–108)
HCT VFR BLD AUTO: 30.7 % (ref 37–47)
HGB BLD-MCNC: 10.1 GM/DL (ref 12–16)
IMM GRANULOCYTES # BLD AUTO: 0.15 THOU/MM3 (ref 0–0.07)
IMM GRANULOCYTES NFR BLD AUTO: 1 %
IRON SATN MFR SERPL: 9 % (ref 20–50)
IRON SERPL-MCNC: 16 UG/DL (ref 50–170)
LYMPHOCYTES ABSOLUTE: 2.4 THOU/MM3 (ref 1–4.8)
LYMPHOCYTES NFR BLD AUTO: 15 %
MAGNESIUM SERPL-MCNC: 1.8 MG/DL (ref 1.6–2.4)
MCH RBC QN AUTO: 28.9 PG (ref 26–33)
MCHC RBC AUTO-ENTMCNC: 32.9 GM/DL (ref 32.2–35.5)
MCV RBC AUTO: 87.7 FL (ref 81–99)
MONOCYTES ABSOLUTE: 1.5 THOU/MM3 (ref 0.4–1.3)
MONOCYTES NFR BLD AUTO: 9.4 %
NEUTROPHILS ABSOLUTE: 11.6 THOU/MM3 (ref 1.8–7.7)
NEUTROPHILS NFR BLD AUTO: 73.6 %
NRBC BLD AUTO-RTO: 0 /100 WBC
PLATELET # BLD AUTO: 361 THOU/MM3 (ref 130–400)
PMV BLD AUTO: 9.9 FL (ref 9.4–12.4)
POTASSIUM SERPL-SCNC: 3.9 MEQ/L (ref 3.5–5.2)
PROT SERPL-MCNC: 6.2 G/DL (ref 6.1–8)
RBC # BLD AUTO: 3.5 MILL/MM3 (ref 4.2–5.4)
SODIUM SERPL-SCNC: 140 MEQ/L (ref 135–145)
TIBC SERPL-MCNC: 177 UG/DL (ref 171–450)
WBC # BLD AUTO: 15.7 THOU/MM3 (ref 4.8–10.8)

## 2024-09-03 PROCEDURE — 88305 TISSUE EXAM BY PATHOLOGIST: CPT

## 2024-09-03 PROCEDURE — 83540 ASSAY OF IRON: CPT

## 2024-09-03 PROCEDURE — 6360000002 HC RX W HCPCS

## 2024-09-03 PROCEDURE — 10160 PNXR ASPIR ABSC HMTMA BULLA: CPT

## 2024-09-03 PROCEDURE — 1200000003 HC TELEMETRY R&B

## 2024-09-03 PROCEDURE — 87075 CULTR BACTERIA EXCEPT BLOOD: CPT

## 2024-09-03 PROCEDURE — 87077 CULTURE AEROBIC IDENTIFY: CPT

## 2024-09-03 PROCEDURE — 82607 VITAMIN B-12: CPT

## 2024-09-03 PROCEDURE — 6370000000 HC RX 637 (ALT 250 FOR IP): Performed by: RADIOLOGY

## 2024-09-03 PROCEDURE — 0FD23ZX EXTRACTION OF LEFT LOBE LIVER, PERCUTANEOUS APPROACH, DIAGNOSTIC: ICD-10-PCS | Performed by: RADIOLOGY

## 2024-09-03 PROCEDURE — 87070 CULTURE OTHR SPECIMN AEROBIC: CPT

## 2024-09-03 PROCEDURE — 83735 ASSAY OF MAGNESIUM: CPT

## 2024-09-03 PROCEDURE — 80053 COMPREHEN METABOLIC PANEL: CPT

## 2024-09-03 PROCEDURE — 2580000003 HC RX 258

## 2024-09-03 PROCEDURE — 36415 COLL VENOUS BLD VENIPUNCTURE: CPT

## 2024-09-03 PROCEDURE — 88173 CYTOPATH EVAL FNA REPORT: CPT

## 2024-09-03 PROCEDURE — 99233 SBSQ HOSP IP/OBS HIGH 50: CPT | Performed by: FAMILY MEDICINE

## 2024-09-03 PROCEDURE — 87205 SMEAR GRAM STAIN: CPT

## 2024-09-03 PROCEDURE — 6360000002 HC RX W HCPCS: Performed by: RADIOLOGY

## 2024-09-03 PROCEDURE — 99223 1ST HOSP IP/OBS HIGH 75: CPT | Performed by: STUDENT IN AN ORGANIZED HEALTH CARE EDUCATION/TRAINING PROGRAM

## 2024-09-03 PROCEDURE — 83550 IRON BINDING TEST: CPT

## 2024-09-03 PROCEDURE — 82746 ASSAY OF FOLIC ACID SERUM: CPT

## 2024-09-03 PROCEDURE — 77012 CT SCAN FOR NEEDLE BIOPSY: CPT

## 2024-09-03 PROCEDURE — 85025 COMPLETE CBC W/AUTO DIFF WBC: CPT

## 2024-09-03 PROCEDURE — 6370000000 HC RX 637 (ALT 250 FOR IP)

## 2024-09-03 PROCEDURE — 82728 ASSAY OF FERRITIN: CPT

## 2024-09-03 PROCEDURE — 88172 CYTP DX EVAL FNA 1ST EA SITE: CPT

## 2024-09-03 RX ORDER — NEOMYCIN/BACITRACIN/POLYMYXINB 3.5-400-5K
OINTMENT (GRAM) TOPICAL PRN
Status: COMPLETED | OUTPATIENT
Start: 2024-09-03 | End: 2024-09-03

## 2024-09-03 RX ORDER — MIDAZOLAM HYDROCHLORIDE 1 MG/ML
INJECTION INTRAMUSCULAR; INTRAVENOUS PRN
Status: COMPLETED | OUTPATIENT
Start: 2024-09-03 | End: 2024-09-03

## 2024-09-03 RX ORDER — FENTANYL CITRATE 50 UG/ML
INJECTION, SOLUTION INTRAMUSCULAR; INTRAVENOUS PRN
Status: COMPLETED | OUTPATIENT
Start: 2024-09-03 | End: 2024-09-03

## 2024-09-03 RX ADMIN — MIDAZOLAM 1 MG: 1 INJECTION INTRAMUSCULAR; INTRAVENOUS at 09:23

## 2024-09-03 RX ADMIN — METRONIDAZOLE 500 MG: 500 INJECTION, SOLUTION INTRAVENOUS at 05:57

## 2024-09-03 RX ADMIN — DILTIAZEM HYDROCHLORIDE 120 MG: 60 CAPSULE, EXTENDED RELEASE ORAL at 20:18

## 2024-09-03 RX ADMIN — CEFTRIAXONE SODIUM 2000 MG: 2 INJECTION, POWDER, FOR SOLUTION INTRAMUSCULAR; INTRAVENOUS at 22:43

## 2024-09-03 RX ADMIN — FENTANYL CITRATE 50 MCG: 50 INJECTION, SOLUTION INTRAMUSCULAR; INTRAVENOUS at 09:23

## 2024-09-03 RX ADMIN — BACITRACIN ZINC NEOMYCIN SULFATE POLYMYXIN B SULFATE 1 EACH: 400; 3.5; 5 OINTMENT TOPICAL at 09:36

## 2024-09-03 RX ADMIN — DILTIAZEM HYDROCHLORIDE 120 MG: 60 CAPSULE, EXTENDED RELEASE ORAL at 08:03

## 2024-09-03 RX ADMIN — GABAPENTIN 300 MG: 300 CAPSULE ORAL at 08:03

## 2024-09-03 RX ADMIN — SODIUM CHLORIDE, PRESERVATIVE FREE 10 ML: 5 INJECTION INTRAVENOUS at 08:04

## 2024-09-03 RX ADMIN — METRONIDAZOLE 500 MG: 500 INJECTION, SOLUTION INTRAVENOUS at 15:28

## 2024-09-03 RX ADMIN — SODIUM CHLORIDE, PRESERVATIVE FREE 10 ML: 5 INJECTION INTRAVENOUS at 20:18

## 2024-09-03 RX ADMIN — PANTOPRAZOLE SODIUM 40 MG: 40 TABLET, DELAYED RELEASE ORAL at 08:03

## 2024-09-03 RX ADMIN — METRONIDAZOLE 500 MG: 500 INJECTION, SOLUTION INTRAVENOUS at 22:44

## 2024-09-03 RX ADMIN — GABAPENTIN 600 MG: 300 CAPSULE ORAL at 20:18

## 2024-09-03 ASSESSMENT — PAIN DESCRIPTION - ORIENTATION
ORIENTATION: LEFT

## 2024-09-03 ASSESSMENT — PAIN DESCRIPTION - ONSET: ONSET: ON-GOING

## 2024-09-03 ASSESSMENT — PAIN - FUNCTIONAL ASSESSMENT
PAIN_FUNCTIONAL_ASSESSMENT: ACTIVITIES ARE NOT PREVENTED

## 2024-09-03 ASSESSMENT — PAIN DESCRIPTION - PAIN TYPE
TYPE: ACUTE PAIN

## 2024-09-03 ASSESSMENT — PAIN SCALES - GENERAL
PAINLEVEL_OUTOF10: 2

## 2024-09-03 ASSESSMENT — PAIN DESCRIPTION - LOCATION
LOCATION: ABDOMEN

## 2024-09-03 ASSESSMENT — PAIN DESCRIPTION - DESCRIPTORS
DESCRIPTORS: JABBING

## 2024-09-03 ASSESSMENT — PAIN DESCRIPTION - FREQUENCY: FREQUENCY: INTERMITTENT

## 2024-09-03 NOTE — OP NOTE
patient or their representative if unable to give consent including but not limited to bleeding, infection, poking a hole someplace requiring surgery to fix it, having a reaction to medication, and death. The patient or their representative if unable to give consent understood these risks and provided informed consent. Airway was assessed and is adequate for the planned sedation.     Anesthesia: MAC  Estimated Blood Loss: less than 50   Complications: None  Specimens: Was Obtained:  see below     Sedation was administered by anesthesia who monitored the patient during the procedure.   The patient was placed in the left lateral decubitus position.     A forward-viewing Olympus endoscope was lubricated and inserted through the mouth into the oropharynx. Under direct visualization, the upper esophagus was intubated. The scope was advanced through the esophagus and stomach to second portion of duodenum. Scope was slowly withdrawn with good views of mucosal surfaces. The scope was retroflexed in the fundus. Findings and maneuvers are listed in impression below. The patient tolerated the procedure well. The scope was removed. There were no immediate complications.    IMPRESSION:  1.  Esophagus - small hiatal hernia  2.  Stomach - moderate bile gastritis with hematin, biopsied for Hpylori using Prakash criteria  3.  Duodenum - normal, biopsied  4.  No reason for Strep bacteremia/liver abscesses    RECOMMENDATIONS:    1.  Await pathology   2.  Add sucralfate qachs  3.  Regular diet  4.  Colonoscopy given anemia and abdominal pain    Jameson Fields MD  12:48 PM 9/4/2024

## 2024-09-03 NOTE — PROGRESS NOTES
0845 Patient received in CT scanning for liver lesion biopsy pre-procedure assessment. Monitor applied.   0900 Dr. Horta here; spoke to patient. This procedure has been fully reviewed with the patient and written informed consent has been obtained.   0906 Patient assisted to CT table and pre scan started.  0920 Pathology called to CT area for procedure.   0924 Procedure started with Dr. Horta.  0929 Pathology arrived to CT.   0933 Pus like sample collected and given to pathologist for further review.   0934 Procedure completed; patient tolerated well. Post scan obtained.   0936 Bacitracin oint, band aid to site; no bleeding noted.  0940 Patient on bed; comfort ensured.  0942 Report called to Chelsea ABERNATHY on 6K.   1000 Patient taken to 6K via transport. Pt alert and oriented x3; follows commands. Skin pink, warm, and dry. Respirations easy, regular, and nonlabored.

## 2024-09-03 NOTE — PROGRESS NOTES
PROGRESS NOTE      Patient:  Leena Zuñiga  Unit/Bed:-05/005-A  YOB: 1960  MRN: [id number]   Acct: [id number]    PCP: Caryn Damon APRN - CNP    Date of Admission: 8/31/2024 LOS: 3    Date of Evaluation:  9/3/2024    Anticipated Discharge: Pending clinical status/evaluation of infection    Assessment/Plan:    Acute hypoxic respiratory failure, improved  -Episode of 88% on room air SpO2 in the ER, started on 2lpm O2 via NC  -Likely related to bilateral lower lung atelectasis/infiltrate  -Continue ceftriaxone 2 g daily  -Weaned O2 on 9/1, no longer on supplemental O2    Sepsis with leukocytosis, tachycardia, lactic acidosis, fever with gram (+) bacteremia (prelim strep species) with uncertain etiology- unlikely UTI, possible liver abscess  -SIRS 3/4, with fever, tachycardia, leukocytosis on arrival 19.5 (8/31)  -Investigating etiology, b/L lower lung atelectasis/infiltrate, confirmed UTI, liver abscess  -Lactate 0.62 (8/31), repeat lactic acid 0.6 on (9/2)  -Inflammatory markers (ESR 90 and CRP 25.75) elevated 8/31  -Blood cultures x2 on arrival positive for strep species   -Abscess confirmed, ?disseminated strep  -ID following, appreciate recommendations  - Repeated blood cultures x2, negative for growth 9/3   - IR procedure (CT guided aspiration of abscess) 9/3 noted 6mL of thick green fluid removed, sample sent to path   -Culture from hepatic abscess positive for gram positive cocci, likely strep   -Continue Rocephin 2 g IV and metronidazole 500 mg IV    Lower left hepatic lobe abscess, possible hemangioma in dome of right hepatic lobe  -MRI 9/1 showed 3 x 2.1 cm probable hemangioma in the dome of the right lobe of the liver, 4.8 x 3.6 cm and 3.1 x 3.1 cm areas in the left lobe of the liver with abnormal enhancement, metastatic disease cannot be ruled out.  - As per ID can be a superimposed abscess on malignancy.  - Checking AFP, in process. ?poss underlying malignancy  -Elevated alk phos  General: No focal deficit present.      Mental Status: She is alert and oriented to person, place, and time. Mental status is at baseline.   Psychiatric:         Mood and Affect: Mood normal.         Behavior: Behavior normal.         Thought Content: Thought content normal.         Judgment: Judgment normal.        All labs reviewed and interpreted by me:  Labs:   Recent Labs     09/01/24  0601 09/02/24  0546 09/03/24  0638   WBC 19.8* 16.1* 15.7*   HGB 10.3* 10.3* 10.1*   HCT 32.2* 31.6* 30.7*    327 361     Recent Labs     09/01/24  0601 09/02/24  0546 09/03/24  0638    140 140   K 4.6 4.1 3.9    105 104   CO2 24 24 23   BUN 11 10 11   CREATININE 0.7 0.7 0.6   CALCIUM 8.7 8.7 8.9     Recent Labs     08/31/24  1635 09/02/24  0546 09/03/24  0638   AST 19 14 11   ALT 21 12 10*   BILITOT 0.4 0.2* 0.3   ALKPHOS 193* 180* 164*     No results for input(s): \"INR\" in the last 72 hours.  No results for input(s): \"CKTOTAL\", \"TROPONINT\" in the last 72 hours.    Urinalysis:      Lab Results   Component Value Date/Time    NITRU POSITIVE 08/31/2024 05:16 PM    WBCUA 50-75 08/31/2024 05:16 PM    BACTERIA MANY 08/31/2024 05:16 PM    RBCUA 3-5 08/31/2024 05:16 PM    BLOODU SMALL 08/31/2024 05:16 PM    SPECGRAV 1.010 05/24/2022 03:41 PM    GLUCOSEU NEGATIVE 08/31/2024 05:16 PM       All radiology images and reports reviewed and interpreted by me:  Radiology:  MRI ABDOMEN W WO CONTRAST MRCP   Final Result   1. 3 x 2.3 cm probable hemangioma in the dome of the right lobe of the liver.   2. 4.8 x 3.6 cm and 3.1 x 3.1 cm areas of abnormal signal intensity with no   associated abnormal enhancement in the left lobe of the liver. Metastatic   disease cannot be ruled out. Please correlate with liver function tests. If   these are abnormal CT-guided biopsy may be indicated.   3. No intra or extrahepatic biliary dilatation. No evidence of cholelithiasis or   choledocholithiasis.   4. Tiny Bosniak type I cysts in the right

## 2024-09-03 NOTE — PROGRESS NOTES
Pt Name: Leena Zuñiga  MRN: [id number]  [id number]  YOB: 1960  Admit Date: 8/31/2024  4:22 PM  Date of evaluation: 9/3/2024  Primary Care Physician: Caryn Damon, APRN - CNP   6K-05/005-JOSÉ MIGUEL DAVILA  No complaints.  Eating.    O.     Vitals:    09/03/24 1522   BP: 100/66   Pulse:    Resp:    Temp:    SpO2:        Body mass index is 31.77 kg/m².   Awake and alert   clear to auscultation bilaterally   regular rate and rhythm   Soft and nondistended with normal BS, nontender   no cyanosis, clubbing or edema present      Current Meds    acetaminophen  500 mg Oral Once    sodium chloride flush  5-40 mL IntraVENous 2 times per day    [Held by provider] aspirin  81 mg Oral Daily    dilTIAZem  120 mg Oral BID    gabapentin  300 mg Oral QAM    gabapentin  600 mg Oral Nightly    pantoprazole  40 mg Oral Daily    cefTRIAXone (ROCEPHIN) IV  2,000 mg IntraVENous Q24H    metroNIDAZOLE  500 mg IntraVENous Q8H      sodium chloride       Diet: ADULT DIET; Regular    A.  63 y.o. female admitted 8/31/24 for leukocytosis, fever, malaise, dyspnea due to CAP.  Imaging demonstrates liver masses and ^LFT's.  Pt on Meloxicam for DDD.  She was started on ppi 2 weeks ago by [facility] ED.  She has early satiety and anorexia.  Aspiration of liver mass 9/3/24 revealed pus.      Epigastric/LUQ pain  Early satiety and anorexia     Anemia, normo  Meloxicam use     2 Liver lesion 4x4 and 3x3 liver lesions - pus aspirated 9/3/24  Separate liver hemangioma  ^ALP  Hypoalbuminemia  Strep bacteremia     Left nephrolithiasis  DDD - on Meloxicam use     Dyspnea  CAP - on rocephin  Hypoxia, improved with O2     UTI      P.       EGD to eval EP/LUQ pain, early satiety, and anorexia and anemia, average risk     Anemia labs        Jameson Fields MD  4:28 PM 9/3/2024

## 2024-09-03 NOTE — POST SEDATION
[facility]  Sedation/Analgesia Post Sedation Record    Pt Name: Leena Zuñiga  MRN: [id number]  Date of Birth: @birthdate@  Procedure Performed By: Demetria Horta MD, MD  Primary Care Physician: Caryn Damon, APRN - CNP    POST-PROCEDURE    Physicians/Assistants: Demetria Horta MD, MD  Procedure Performed: Aspiration lesion, left lobe liver    Sedation/Anesthesia: Conscious Sedation with Fentanyl & Versed   Estimated Blood Loss:          None  Specimens Removed:  []None [x]Other: 6 ml thick green purulent material      Disposition of Specimen:  [x]Pathology []Other      Complications:   [x]None Immediate []Other:       Post-procedure Diagnosis/Findings:      The lesion in the left lobe of the liver collaspsed.  The adjaent lesion in the left lobe of the liver was still present.  A drainage tube was not placed due to the lack of a normal fat plane between the posterior wall of the left lobe of the liver and the anterior gastric wall.         Recommendations:        Follow post-procedure orders.       Electronically signed by Demetria Horta MD on 9/3/2024 at 9:48 AM

## 2024-09-03 NOTE — PROGRESS NOTES
[facility] Infectious Disease         Progress Note      Leena Zuñiga  MEDICAL RECORD NUMBER:  [id number]  AGE: 63 y.o.   GENDER: female  : 1960  EPISODE DATE:  2024      Assessment:     Pt is a 63yof I am consulted for strep septicemia in setting of liver lesion and epigastric pain. Currently on LEBLANC and CTX.     The typical etiologies of strep infections are dental in nature with translocation, cutaneous spread, primary GI disease (colonization in GI tract). She does have epigastric pain that appears c/w a potential gastric ulcer (not improved with pepcid). She also has hepatic enhancement of unclear etiology (?malignancy, benign mass or infection).      If it is infection, hepatic abscess tends to be polymicrobial. I will follow the blood culture results for further identification of strep species. Based on this, will decide on possible TTE (HANDOC score).  Patient underwent hepatic abscess drainage with IR. Tentative plan for EGD given early satiety.      - Cont CTX and LEBLANC  - Duration tbd  - Hepatic abscess cx in process   - Many g+ cocci in pairs/chains  - Tentative plan for EGD      Subjective:     Chief Complaint   Patient presents with    Fever    Generalized Body Aches         Pt w/ NAEON. She underwent hepatic abscess drainage this morning and is doing well. I explained current culture results.       Patient Active Problem List   Diagnosis Code    Paroxysmal atrial fibrillation (HCC) I48.0    Stenosis of cervical spine M48.02    Traumatic edema of cervical spinal cord (HCC) S14.0XXA    Degeneration of intervertebral disc at C4-C5 level M50.321    DDD (degenerative disc disease), lumbar M51.36    Hx of fusion of cervical spine Z98.1    Polyarthralgia M25.50    Pneumonia due to infectious organism J18.9             PAST MEDICAL HISTORY        Diagnosis Date    Atrial fibrillation (HCC)        PAST SURGICAL HISTORY    Past Surgical History:   Procedure Laterality Date    ABLATION OF  09/03/2024 06:38 AM     BMP:   Lab Results   Component Value Date/Time     09/03/2024 06:38 AM    K 3.9 09/03/2024 06:38 AM    K 3.9 01/19/2023 03:28 PM     09/03/2024 06:38 AM    CO2 23 09/03/2024 06:38 AM    BUN 11 09/03/2024 06:38 AM    CREATININE 0.6 09/03/2024 06:38 AM     PT/INR:   Lab Results   Component Value Date/Time    INR 0.94 01/19/2023 03:28 PM     Prealbumin: No results found for: \"PREALBUMIN\"  Albumin:No results found for: \"LABALBU\"  Sed Rate:  Lab Results   Component Value Date/Time    SEDRATE 90 08/31/2024 10:30 PM     CRP:   Lab Results   Component Value Date/Time    CRP 25.75 08/31/2024 10:30 PM     Micro:   Lab Results   Component Value Date/Time    BC No growth 24 hours. 08/31/2024 05:26 PM      Hemoglobin A1C: No results found for: \"LABA1C\"        Electronically signed by Durga Romo MD TD@ at 10:07 AM

## 2024-09-03 NOTE — PROGRESS NOTES
09/03/24 1518   Encounter Summary   Encounter Overview/Reason Initial Encounter   Service Provided For Patient   Referral/Consult From Rounding   Support System Family members   Last Encounter  09/03/24   Complexity of Encounter Low   Begin Time 1510   End Time  1515   Total Time Calculated 5 min   Assessment/Intervention/Outcome   Assessment Calm   Intervention Active listening   Outcome Expressed Gratitude     This is a spiritual health encounter as a part of rounds. Patient, Leena, was resting at this time and declined a longer visit. She stated she is doing well and expressed gratitude for  availability.  provided information regarding  services.     team will remain available to support patient/family, PRN.       Chaplain Kim Agudelo M.Div     Spiritual Care Department  [address] [address] [phone]

## 2024-09-03 NOTE — H&P
[facility] Endoscopy    Pre-Endoscopy H&PE      Patient: Leena Zuñiga    : 1960    Acct#: [id number]  Primary Care Physician: Caryn Damon APRN - CNP   Date of evaluation: 2024    Planned Procedure:    [x]EGD    []Enteroscopy    []PEG    []PEG change    []PEG removal  []Variceal banding    [x]Biopsy   []Dilation      []Control of bleeding  []Destruction of lesion by APC    []Stent Placement  []Foreign Body Removal    []Snare Polypectomy  []Other:         Planned Sedation  []Conscious Sedation [x]MAC/Propofol []Anesthesia    []None    Airway:  Adequate for planned sedation    Indication:   Epigastric pain, early satiety, and anorexia and anemia     History:  63 y.o. female admitted 24 for leukocytosis, fever, malaise, dyspnea due to CAP.  Imaging demonstrates liver masses and ^LFT's.  Pt on Meloxicam for DDD.  She was started on ppi 2 weeks ago by [facility].  She has early satiety and anorexia.     Physical Exam:  VITALS: /82   Pulse 94   Temp (!) 102.1 °F (38.9 °C) (Oral)   Resp 16   Ht 1.575 m (5' 2\")   Wt 78.8 kg (173 lb 11.6 oz)   SpO2 94%   BMI 31.77 kg/m²   The patient is a 63 y.o. female in no acute distress.  HEAD: Normal cephalic/atraumatic. Extra-occular motions intact bilaterally.  NECK: No lymphadenopathy or bruits.  CHEST: Rises equally on inspiration. Clear to auscultation bilaterally.   CARDIOVASCULAR: Regular rate and rhythm without murmurs, rubs or gallops.   ABDOMEN: Soft, epigastric tenderness, nondistended with normal bowel sounds. No Hepatosplemomegaly.  UPPER EXTREMITIES: no cyanosis, clubbing, or edema.  DERM: no rash or jaundice.  LOWER EXTREMITIES: no cyanosis, clubbing, or edema.  NEURO: Alert and oriented times four. Patient moves all extremities and has gross sensation in all extremities.    ASA: ASA 2 - Patient with mild systemic disease with no functional limitations    See GI consult dated 24    Jameson Fields,  MD  8:31 PM 9/2/2024

## 2024-09-03 NOTE — CARE COORDINATION
Case Management Assessment Initial Evaluation    Date/Time of Evaluation: 9/3/2024 8:18 AM  Assessment Completed by: Gaston Blancas RN    If patient is discharged prior to next notation, then this note serves as note for discharge by case management.    Patient Name: Leena Zuñiga                   YOB: 1960  Diagnosis: Acute cystitis with hematuria [N30.01]  Acute respiratory failure with hypoxia (HCC) [J96.01]  Pneumonia of both lower lobes due to infectious organism [J18.9]  Pneumonia due to infectious organism [J18.9]                   Date / Time: 2024  4:22 PM  Location: [address] [place] [address]     Patient Admission Status: Inpatient   Readmission Risk Low 0-14, Mod 15-19), High > 20: Readmission Risk Score: 8.2    Current PCP: Caryn Damon APRN - CNP  Health Care Decision Makers:     Additional Case Management Notes: WBC 15.7, albumin 2.9, CRP 25.75. IV rocephin, IV flagyl, pain control. ID following. GI planning EGD.    Procedures: 9/3 liver bx in IR    Imagin/1 MRCP:  probable hemangioma in RL of liver. Areas of abn signal density in LL of liver, possible metastatic disease.    cxray: BLL atelectasis/infiltrate    Patient Goals/Plan/Treatment Preferences: Met with Leena and her . She plans to return home at discharge. At this time, she denies all discharge needs. Follow for possible atb needs.        24 1210   Service Assessment   Patient Orientation Alert and Oriented   Cognition Alert   History Provided By Patient   Primary Caregiver Self   Support Systems Spouse/Significant Other   Patient's Healthcare Decision Maker is: Legal Next of Kin   PCP Verified by CM Yes   Prior Functional Level Independent in ADLs/IADLs   Current Functional Level Independent in ADLs/IADLs   Can patient return to prior living arrangement Yes   Ability to make needs known: Good   Family able to assist with home care needs: Yes   Would you like for me to discuss the discharge plan with any other  family members/significant others, and if so, who? Yes  ( present)   Financial Resources Other (Comment)  (commercial)   Community Resources None   Discharge Planning   Type of Residence House   Living Arrangements Spouse/Significant Other   Current Services Prior To Admission None   Potential Assistance Needed N/A   DME Ordered? No   Potential Assistance Purchasing Medications No   Type of Home Care Services None   Patient expects to be discharged to: House   Condition of Participation: Discharge Planning   The Plan for Transition of Care is related to the following treatment goals: keep fever below 99 degrees

## 2024-09-03 NOTE — PRE SEDATION
[facility]  Sedation/Analgesia History & Physical    Pt Name: Leena Zuñiga  MRN: [id number]  YOB: 1960  Provider Performing Procedure: Demetria Horta MD, MD  Primary Care Physician: Caryn Damon, DORA - CNP    Formulation and discussion of sedation / procedure plans, risks, benefits, side effects and alternatives with patient and/or responsible adult completed.    PRE-PROCEDURE   DNR-CCA/DNR-CC []Yes [x]No  Brief History/Pre-Procedure Diagnosis: liver lesions, left lobe          MEDICAL HISTORY  []CAD/Valve  []Liver Disease  []Lung Disease []Diabetes  []Hypertension []Renal Disease  []Additional information:       has a past medical history of Atrial fibrillation (HCC).    SURGICAL HISTORY   has a past surgical history that includes ablation of dysrhythmic focus; Hysterectomy, vaginal (2008); Carpal tunnel release (Bilateral, 2012); and Neck surgery (06/01/2022).  Additional information:       ALLERGIES   Allergies as of 08/31/2024 - Fully Reviewed 08/31/2024   Allergen Reaction Noted    Duloxetine hcl Other (See Comments) 12/29/2023     Additional information:       MEDICATIONS   Coumadin Use Last 5 Days [x]No []Yes  Antiplatelet drug therapy use last 5 days  []No [x]Yes, ASA last use 4 days ago  Other anticoagulant use last 5 days  [x]No []Yes    Current Facility-Administered Medications:     acetaminophen (TYLENOL) tablet 500 mg, 500 mg, Oral, Once, Yosi Madison, PA    sodium chloride flush 0.9 % injection 5-40 mL, 5-40 mL, IntraVENous, 2 times per day, Derek Webber APRN - CNP, 10 mL at 09/03/24 0804    sodium chloride flush 0.9 % injection 5-40 mL, 5-40 mL, IntraVENous, PRN, Derek Webber APRN - CNP    0.9 % sodium chloride infusion, , IntraVENous, PRN, Derek Webber APRN - CNP    potassium chloride (KLOR-CON M) extended release tablet 40 mEq, 40 mEq, Oral, PRN **OR** potassium bicarb-citric acid (EFFER-K) effervescent tablet 40 mEq, 40 mEq, Oral, PRN **OR** potassium  tablet Take 1 tablet by mouth daily 8/21/24  Yes Caryn Damon APRN - CNP   Cyanocobalamin (B-12) 3000 MCG LOZG  8/6/24  Yes Mor Rojas MD   gabapentin (NEURONTIN) 300 MG capsule take 1 capsule by mouth every morning then 1 capsule IN THE AFTERNOON and 2 capsules at bedtime 8/21/24 11/19/24 Yes Caryn Damon APRN - CNP   pantoprazole (PROTONIX) 40 MG tablet Take 1 tablet by mouth daily 8/21/24 9/20/24 Yes Caryn Damon APRN - CNP   aspirin 81 MG tablet Take 1 tablet by mouth daily   Yes Mor Rojas MD   ascorbic acid (VITAMIN C) 500 MG tablet Take 2 tablets by mouth every morning  Patient not taking: Reported on 8/31/2024    Mor Rojas MD   vitamin D (CHOLECALCIFEROL) 50 MCG (2000 UT) CAPS capsule Take 1 capsule by mouth every morning  Patient not taking: Reported on 8/21/2024    Mor Rojas MD   Handicap Placard MISC by Does not apply route x5 years 10/4/23   Caryn Damon APRN - CNP     Additional information:       VITAL SIGNS   Vitals:    09/03/24 0759   BP: 118/80   Pulse: 78   Resp:    Temp: 100.3 °F (37.9 °C)   SpO2: 93%       PHYSICAL:   Heart:  [x]Regular rate and rhythm  []Other:    Lungs:  [x]Clear    []Other:    Abdomen: []Soft    []Other:    Mental Status: []Alert & Oriented  []Other:      PLANNED PROCEDURE   [x]Biospy []Arteriogram              [x]Drainage   []Mediport Insertion  []Fistulogram []IV access       []Vertebroplasty / Augmentation  []IVC filter []Dialysis catheter []Biliary drainage  []Other: []CAPD Catheter []Nephrostomy Tube / Stent  SEDATION  Planned agent:[x]Midazolam []Meperidine [x]Sublimaze []Dilaudid []Morphine     []Diazepam  []Other:     ASA Classification:  []1 [x]2 []3 []4 []5  Class 1: A normal healthy patient  Class 2: Pt with mild to moderate systemic disease  Class 3: Severe systemic disease or disturbance  Class 4: Severe systemic disorders that are already life threatening.  Class 5: Moribund pt with little chances of

## 2024-09-03 NOTE — INTERVAL H&P NOTE
Update History & Physical    EGD delayed as pt received sedation for CT guided liver biopsy today.    Plan EGD tomorrow.    HXPE otherwise unchanged.    Electronically signed by Jameson Fields MD on 9/3/2024 at 4:32 PM

## 2024-09-04 ENCOUNTER — ANESTHESIA (OUTPATIENT)
Dept: ENDOSCOPY | Age: 64
End: 2024-09-04
Payer: COMMERCIAL

## 2024-09-04 ENCOUNTER — ANESTHESIA EVENT (OUTPATIENT)
Dept: ENDOSCOPY | Age: 64
End: 2024-09-04
Payer: COMMERCIAL

## 2024-09-04 PROBLEM — J96.01 ACUTE RESPIRATORY FAILURE WITH HYPOXIA (HCC): Status: ACTIVE | Noted: 2024-09-04

## 2024-09-04 LAB
ALBUMIN SERPL BCG-MCNC: 2.9 G/DL (ref 3.5–5.1)
ALP SERPL-CCNC: 144 U/L (ref 38–126)
ALT SERPL W/O P-5'-P-CCNC: 10 U/L (ref 11–66)
ANION GAP SERPL CALC-SCNC: 14 MEQ/L (ref 8–16)
AST SERPL-CCNC: 12 U/L (ref 5–40)
BASOPHILS ABSOLUTE: 0.1 THOU/MM3 (ref 0–0.1)
BASOPHILS NFR BLD AUTO: 0.4 %
BILIRUB SERPL-MCNC: 0.2 MG/DL (ref 0.3–1.2)
BUN SERPL-MCNC: 13 MG/DL (ref 7–22)
CALCIUM SERPL-MCNC: 8.7 MG/DL (ref 8.5–10.5)
CHLORIDE SERPL-SCNC: 102 MEQ/L (ref 98–111)
CO2 SERPL-SCNC: 23 MEQ/L (ref 23–33)
CREAT SERPL-MCNC: 0.5 MG/DL (ref 0.4–1.2)
DEPRECATED RDW RBC AUTO: 42.5 FL (ref 35–45)
EOSINOPHIL NFR BLD AUTO: 0.7 %
EOSINOPHILS ABSOLUTE: 0.1 THOU/MM3 (ref 0–0.4)
ERYTHROCYTE [DISTWIDTH] IN BLOOD BY AUTOMATED COUNT: 13.6 % (ref 11.5–14.5)
FOLATE SERPL-MCNC: > 20 NG/ML (ref 4.8–24.2)
GFR SERPL CREATININE-BSD FRML MDRD: > 90 ML/MIN/1.73M2
GLUCOSE SERPL-MCNC: 100 MG/DL (ref 70–108)
HCT VFR BLD AUTO: 29.5 % (ref 37–47)
HGB BLD-MCNC: 9.9 GM/DL (ref 12–16)
IMM GRANULOCYTES # BLD AUTO: 0.11 THOU/MM3 (ref 0–0.07)
IMM GRANULOCYTES NFR BLD AUTO: 0.8 %
LYMPHOCYTES ABSOLUTE: 2.4 THOU/MM3 (ref 1–4.8)
LYMPHOCYTES NFR BLD AUTO: 16.2 %
MCH RBC QN AUTO: 28.9 PG (ref 26–33)
MCHC RBC AUTO-ENTMCNC: 33.6 GM/DL (ref 32.2–35.5)
MCV RBC AUTO: 86.3 FL (ref 81–99)
MONOCYTES ABSOLUTE: 1 THOU/MM3 (ref 0.4–1.3)
MONOCYTES NFR BLD AUTO: 7.1 %
NEUTROPHILS ABSOLUTE: 10.9 THOU/MM3 (ref 1.8–7.7)
NEUTROPHILS NFR BLD AUTO: 74.8 %
NRBC BLD AUTO-RTO: 0 /100 WBC
PLATELET # BLD AUTO: 379 THOU/MM3 (ref 130–400)
PMV BLD AUTO: 9.9 FL (ref 9.4–12.4)
POTASSIUM SERPL-SCNC: 3.4 MEQ/L (ref 3.5–5.2)
PROT SERPL-MCNC: 6 G/DL (ref 6.1–8)
RBC # BLD AUTO: 3.42 MILL/MM3 (ref 4.2–5.4)
SODIUM SERPL-SCNC: 139 MEQ/L (ref 135–145)
VIT B12 SERPL-MCNC: > 2000 PG/ML (ref 211–911)
WBC # BLD AUTO: 14.6 THOU/MM3 (ref 4.8–10.8)

## 2024-09-04 PROCEDURE — 3700000000 HC ANESTHESIA ATTENDED CARE: Performed by: INTERNAL MEDICINE

## 2024-09-04 PROCEDURE — 85025 COMPLETE CBC W/AUTO DIFF WBC: CPT

## 2024-09-04 PROCEDURE — 80053 COMPREHEN METABOLIC PANEL: CPT

## 2024-09-04 PROCEDURE — 7100000011 HC PHASE II RECOVERY - ADDTL 15 MIN: Performed by: INTERNAL MEDICINE

## 2024-09-04 PROCEDURE — 1200000003 HC TELEMETRY R&B

## 2024-09-04 PROCEDURE — 0DB68ZX EXCISION OF STOMACH, VIA NATURAL OR ARTIFICIAL OPENING ENDOSCOPIC, DIAGNOSTIC: ICD-10-PCS | Performed by: INTERNAL MEDICINE

## 2024-09-04 PROCEDURE — 88305 TISSUE EXAM BY PATHOLOGIST: CPT

## 2024-09-04 PROCEDURE — 36415 COLL VENOUS BLD VENIPUNCTURE: CPT

## 2024-09-04 PROCEDURE — 0DB98ZX EXCISION OF DUODENUM, VIA NATURAL OR ARTIFICIAL OPENING ENDOSCOPIC, DIAGNOSTIC: ICD-10-PCS | Performed by: INTERNAL MEDICINE

## 2024-09-04 PROCEDURE — 6370000000 HC RX 637 (ALT 250 FOR IP): Performed by: INTERNAL MEDICINE

## 2024-09-04 PROCEDURE — 2580000003 HC RX 258

## 2024-09-04 PROCEDURE — 99223 1ST HOSP IP/OBS HIGH 75: CPT | Performed by: STUDENT IN AN ORGANIZED HEALTH CARE EDUCATION/TRAINING PROGRAM

## 2024-09-04 PROCEDURE — 2720000010 HC SURG SUPPLY STERILE: Performed by: INTERNAL MEDICINE

## 2024-09-04 PROCEDURE — 6360000002 HC RX W HCPCS: Performed by: STUDENT IN AN ORGANIZED HEALTH CARE EDUCATION/TRAINING PROGRAM

## 2024-09-04 PROCEDURE — 6370000000 HC RX 637 (ALT 250 FOR IP)

## 2024-09-04 PROCEDURE — 2580000003 HC RX 258: Performed by: STUDENT IN AN ORGANIZED HEALTH CARE EDUCATION/TRAINING PROGRAM

## 2024-09-04 PROCEDURE — 99233 SBSQ HOSP IP/OBS HIGH 50: CPT | Performed by: INTERNAL MEDICINE

## 2024-09-04 PROCEDURE — 7100000010 HC PHASE II RECOVERY - FIRST 15 MIN: Performed by: INTERNAL MEDICINE

## 2024-09-04 PROCEDURE — 3609012400 HC EGD TRANSORAL BIOPSY SINGLE/MULTIPLE: Performed by: INTERNAL MEDICINE

## 2024-09-04 PROCEDURE — 6360000002 HC RX W HCPCS

## 2024-09-04 RX ORDER — SUCRALFATE 1 G/1
1 TABLET ORAL
Status: DISCONTINUED | OUTPATIENT
Start: 2024-09-04 | End: 2024-09-06 | Stop reason: HOSPADM

## 2024-09-04 RX ORDER — POLYETHYLENE GLYCOL 3350 17 G/17G
238 POWDER, FOR SOLUTION ORAL ONCE
Status: COMPLETED | OUTPATIENT
Start: 2024-09-04 | End: 2024-09-04

## 2024-09-04 RX ORDER — SENNOSIDES A AND B 8.6 MG/1
15 TABLET, FILM COATED ORAL ONCE
Status: COMPLETED | OUTPATIENT
Start: 2024-09-04 | End: 2024-09-04

## 2024-09-04 RX ORDER — METRONIDAZOLE 500 MG/100ML
500 INJECTION, SOLUTION INTRAVENOUS EVERY 8 HOURS
Status: DISCONTINUED | OUTPATIENT
Start: 2024-09-04 | End: 2024-09-06 | Stop reason: HOSPADM

## 2024-09-04 RX ADMIN — DILTIAZEM HYDROCHLORIDE 120 MG: 60 CAPSULE, EXTENDED RELEASE ORAL at 09:08

## 2024-09-04 RX ADMIN — GABAPENTIN 300 MG: 300 CAPSULE ORAL at 09:08

## 2024-09-04 RX ADMIN — POLYETHYLENE GLYCOL 3350 238 G: 17 POWDER, FOR SOLUTION ORAL at 16:25

## 2024-09-04 RX ADMIN — ONDANSETRON 4 MG: 2 INJECTION INTRAMUSCULAR; INTRAVENOUS at 19:45

## 2024-09-04 RX ADMIN — METRONIDAZOLE 500 MG: 500 INJECTION, SOLUTION INTRAVENOUS at 23:21

## 2024-09-04 RX ADMIN — CEFTRIAXONE SODIUM 2000 MG: 2 INJECTION, POWDER, FOR SOLUTION INTRAMUSCULAR; INTRAVENOUS at 22:26

## 2024-09-04 RX ADMIN — GABAPENTIN 600 MG: 300 CAPSULE ORAL at 20:48

## 2024-09-04 RX ADMIN — SENNOSIDES 129 MG: 8.6 TABLET, FILM COATED ORAL at 15:58

## 2024-09-04 RX ADMIN — METRONIDAZOLE 500 MG: 500 INJECTION, SOLUTION INTRAVENOUS at 16:27

## 2024-09-04 RX ADMIN — DILTIAZEM HYDROCHLORIDE 120 MG: 60 CAPSULE, EXTENDED RELEASE ORAL at 20:47

## 2024-09-04 RX ADMIN — SODIUM CHLORIDE, PRESERVATIVE FREE 10 ML: 5 INJECTION INTRAVENOUS at 20:48

## 2024-09-04 RX ADMIN — POTASSIUM CHLORIDE 40 MEQ: 1500 TABLET, EXTENDED RELEASE ORAL at 06:39

## 2024-09-04 RX ADMIN — METRONIDAZOLE 500 MG: 500 INJECTION, SOLUTION INTRAVENOUS at 06:13

## 2024-09-04 RX ADMIN — SODIUM CHLORIDE, PRESERVATIVE FREE 10 ML: 5 INJECTION INTRAVENOUS at 09:08

## 2024-09-04 RX ADMIN — SUCRALFATE 1 G: 1 TABLET ORAL at 18:27

## 2024-09-04 RX ADMIN — SUCRALFATE 1 G: 1 TABLET ORAL at 20:47

## 2024-09-04 ASSESSMENT — PAIN - FUNCTIONAL ASSESSMENT
PAIN_FUNCTIONAL_ASSESSMENT: NONE - DENIES PAIN
PAIN_FUNCTIONAL_ASSESSMENT: NONE - DENIES PAIN

## 2024-09-04 NOTE — PROGRESS NOTES
PROGRESS NOTE      Patient:  Leena Zuñiga  Unit/Bed:[facility] ENDO POOL RM/NONE  YOB: 1960  MRN: [id number]   Acct: [id number]    PCP: Caryn Damon APRN - CNP    Date of Admission: 8/31/2024 LOS: 4    Date of Evaluation:  9/4/2024    Anticipated Discharge: Pending clinical status/evaluation of infection    Assessment/Plan:    Acute hypoxic respiratory failure, improved  -Episode of 88% on room air SpO2 in the ER, started on 2lpm O2 via NC  -Likely related to bilateral lower lung atelectasis/infiltrate  -Continue ceftriaxone 2 g daily  -Weaned O2 on 9/1, no longer on supplemental O2    Sepsis with leukocytosis, tachycardia, lactic acidosis, fever with gram (+) bacteremia (prelim strep species) with uncertain etiology- unlikely UTI, possible liver abscess  -SIRS 3/4, with fever, tachycardia, leukocytosis on arrival 19.5 (8/31)  -Investigating etiology, b/L lower lung atelectasis/infiltrate, confirmed UTI, liver abscess  -Lactate 0.62 (8/31), repeat lactic acid 0.6 on (9/2)  -Inflammatory markers (ESR 90 and CRP 25.75) elevated 8/31  -Blood cultures x2 on arrival positive for strep species   -Abscess confirmed, ?disseminated strep  -ID following, appreciate recommendations  - Repeated blood cultures x2, negative for growth 9/3   - IR procedure (CT guided aspiration of abscess) 9/3 noted 6mL of thick green fluid removed, sample sent to path   -Culture from hepatic abscess positive for gram positive cocci, likely strep   -EGD negative for PUD, unclear etiology.   -Continue Rocephin 2 g IV and metronidazole 500 mg IV as per ID    Lower left hepatic lobe abscess, possible hemangioma in dome of right hepatic lobe  -MRI 9/1 showed 3 x 2.1 cm probable hemangioma in the dome of the right lobe of the liver, 4.8 x 3.6 cm and 3.1 x 3.1 cm areas in the left lobe of the liver with abnormal enhancement, metastatic disease cannot be ruled out.  - As per ID can be a superimposed abscess on malignancy.  - Checking  atelectasis/infiltrate.               **This report has been created using voice recognition software. It may contain   minor errors which are inherent in voice recognition technology.**         Electronically signed by Dr. Sharad Stringer          Diet: Diet NPO Exceptions are: Sips of Water with Meds    Microbiology: yes -pneumonia panel, urine culture pending, possible hepatic abscess  Antibiotics: yes -Rocephin 2 g IV, metronidazole 500 mg    Steroids: no    Telemetry: [x]Yes / []No  Telemetry Review of past 24 hours:  Normal sinus rhythm    LDA: []CVC / []PICC / []Midline / []Lopez / []Drains / []Mediport / [x]PIV / []None    Labs (still needed?): [x]Yes / []No  IVF (still needed?): [x]Yes / []No    Level of care: []Step Down / [x]Med-Surg  Bed Status: [x]Inpatient / []Observation    DVT Prophylaxis: [] Lovenox / [] Heparin / [x] SCDs / [] Already on Systemic Anticoagulation / [] None     PT/OT: []Yes / [x]No    Disposition:    [x] Home       [] TCU       [] Rehab       [] Psych       [] SNF       [] Long Term Care Facility       [] Other-    Code Status: Full Code      An electronic signature was used to authenticate this note  - River Lorenzo DO PGY-1 on 9/4/2024 at 12:23 PM

## 2024-09-04 NOTE — PROGRESS NOTES
EGD completed, Pictures taken.biopsies taken, 2 jars labeled and sent to lab.  Pt tolerated well.     Scope  Used.

## 2024-09-04 NOTE — ANESTHESIA POSTPROCEDURE EVALUATION
Department of Anesthesiology  Postprocedure Note    Patient: Leena Zuñiga  MRN: [id number]  YOB: 1960  Date of evaluation: 9/4/2024    Procedure Summary       Date: 09/04/24 Room / Location: [place] [address] / [facility]    Anesthesia Start: 1235 Anesthesia Stop: 1247    Procedure: ESOPHAGOGASTRODUODENOSCOPY BIOPSY Diagnosis:       Upper GI bleed      (Upper GI bleed [K92.2])    Surgeons: Jameson Fields MD Responsible Provider: Dhaval Martines DO    Anesthesia Type: MAC ASA Status: 3            Anesthesia Type: No value filed.    Gloria Phase I: Gloria Score: 10    Gloria Phase II:      Anesthesia Post Evaluation    Patient location during evaluation: PACU  Patient participation: complete - patient participated  Level of consciousness: awake  Pain score: 0  Airway patency: patent  Nausea & Vomiting: no vomiting and no nausea  Cardiovascular status: hemodynamically stable  Respiratory status: spontaneous ventilation and room air  Hydration status: stable        No notable events documented.

## 2024-09-04 NOTE — PROGRESS NOTES
Pt in phase 2 of recovery. Fully awake, Denies pain.     FAYE Fields in to speak with patient and family at bedside regarding procedure.     Report called to 6k Nurse Chelsea.

## 2024-09-04 NOTE — PLAN OF CARE
Problem: Discharge Planning  Goal: Discharge to home or other facility with appropriate resources  Outcome: Progressing     Problem: Pain  Goal: Verbalizes/displays adequate comfort level or baseline comfort level  Outcome: Progressing  Flowsheets (Taken 9/3/2024 1519 by Chelsea Richardson, RN)  Verbalizes/displays adequate comfort level or baseline comfort level: Encourage patient to monitor pain and request assistance     Problem: Safety - Adult  Goal: Free from fall injury  Outcome: Progressing

## 2024-09-04 NOTE — PROGRESS NOTES
[facility] Infectious Disease         Progress Note      Leena Zuñiga  MEDICAL RECORD NUMBER:  [id number]  AGE: 63 y.o.   GENDER: female  : 1960  EPISODE DATE:  2024      Assessment:     Pt is a 63yof I am consulted for strep septicemia in setting of liver lesion and epigastric pain. Currently on LEBLANC and CTX.     The typical etiologies of strep infections are dental in nature with translocation, cutaneous spread, primary GI disease (colonization in GI tract). She does have epigastric pain that appears c/w a potential gastric ulcer (not improved with pepcid). She also has hepatic enhancement of unclear etiology (?malignancy, benign mass or infection).      If it is infection, hepatic abscess tends to be polymicrobial. I will follow the blood culture results for further identification of strep species. Based on this, will decide on possible TTE (HANDOC score).  Patient underwent hepatic abscess drainage with IR. Tentative plan for EGD given early satiety.      - Cont CTX and LEBLANC  - Duration tbd  - Hepatic abscess cx in process   - Many g+ cocci in pairs/chains  - EGD today  - Requested susceptibility for strep intermedius      Subjective:     Chief Complaint   Patient presents with    Fever    Generalized Body Aches         Pt w/ NAEON. She is on CTX and LEBLANC.    EGD today to assess for possible gastric ulcer. I explained results of cultures as well as updates to antibiotics.     She is overall doing well today and has no complaints/concerns.     Patient Active Problem List   Diagnosis Code    Paroxysmal atrial fibrillation (HCC) I48.0    Stenosis of cervical spine M48.02    Traumatic edema of cervical spinal cord (HCC) S14.0XXA    Degeneration of intervertebral disc at C4-C5 level M50.321    DDD (degenerative disc disease), lumbar M51.36    Hx of fusion of cervical spine Z98.1    Polyarthralgia M25.50    Pneumonia due to infectious organism J18.9             PAST MEDICAL HISTORY

## 2024-09-04 NOTE — OP NOTE
[facility] Endoscopy    Patient: Leena Zuñiga  : 1960  [id number]  Date of evaluation: 2024  Primary Care Physician: Caryn Damon APRN - CNP     Procedure:    [x]Colonoscopy  []Flex Sigmoid []EUS, rectal     [x]Biopsy   [x]Snare Polypectomy    []Control of bleeding  []Destruction of lesion by APC    []Stent Placement  []Foreign Body Removal    []Dilation    []Other:    []Aborted:   []Tattoo    Indication:   Epigastric pain, anemia      History:  63 y.o. female admitted 24 for leukocytosis, fever, malaise, dyspnea due to CAP.  Imaging demonstrates liver masses and ^LFT's.  Pt on Meloxicam for DDD.  She was started on ppi 2 weeks ago by [facility].  She has early satiety and anorexia.     24-EGD demonstrate bile gastritis without reason for Strep bacteremia/liver abscesses.     Physical Exam:  VITALS: /77   Pulse 73   Temp 98.7 °F (37.1 °C) (Oral)   Resp 16   Ht 1.575 m (5' 2\")   Wt 82.3 kg (181 lb 7 oz)   SpO2 94%   BMI 33.19 kg/m²   The patient is a 63 y.o. female in no acute distress.  HEAD: Normal cephalic/atraumatic. Extra-occular motions intact bilaterally.  NECK: No lymphadenopathy or bruits.  CHEST: Rises equally on inspiration. Clear to auscultation bilaterally.   CARDIOVASCULAR: Regular rate and rhythm without murmurs, rubs or gallops.   ABDOMEN: Soft, epigastric tenderness, nondistended with normal bowel sounds. No Hepatosplemomegaly.  UPPER EXTREMITIES: no cyanosis, clubbing, or edema.  DERM: no rash or jaundice.  LOWER EXTREMITIES: no cyanosis, clubbing, or edema.  NEURO: Alert and oriented times four. Patient moves all extremities and has gross sensation in all extremities.     ASA: ASA 2 - Patient with mild systemic disease with no functional limitations    MEDICATION:    MAC/Propofol/Anesthesia:  Yes     Photo:  Yes  Biopsy:  Yes      Description of Procedure:  The risks and benefits of the procedure  were described to the patient or their representative if unable to give consent including but not limited to bleeding, infection, poking a hole someplace requiring surgery to fix it, having a reaction to medication, and death. The patient or their representative if unable to give consent understood these risks and provided informed consent. Airway was assessed and is adequate for the planned sedation.     Anesthesia: MAC  Estimated Blood Loss: less than 50   Complications: None  Specimens: Was Obtained:  see below     Sedation was administered by anesthesia who monitored the patient during the procedure.   The patient was placed in the left lateral decubitus position.     The perianal area was inspected, and a digital rectal exam was performed. A forward-viewing Olympus adult colonoscope was lubricated and inserted through the anus into the rectum. Under direct visualization, this was advanced to the terminal ileum.  Cecal base was identified by the ileocecal valve and appendiceal orifice. The scope was then slowly withdrawn with good views of mucosal surfaces. The scope was retroflexed in the rectum. Findings and maneuvers are listed in impression below.     The patient tolerated the procedure well. The scope was removed. The patient was moved to the recovery area. There were no immediate complications.     IMPRESSION:  1.  5-6 mm proximal rectal polyp removed with cold snare   2.  Long, looping, capacious colon  3.  Poor preparation made good with copious lavage  4.  Normal TI  5.  Otherwise normal colon and TI with biopsies taken from TI, Ascending colon, Descending colon, and rectum given the patient's epigastric pain.  6.  No reason for Strep bacteremia/liver abscesses  7.  SHANNON without source     RECOMMENDATIONS:    1.  Await pathology results   2.  Repeat colonoscopy in 3-10 years depending upon pathology results  3.  CTE in preparation for pillcam    Jameson Fields MD  2:01 PM 9/5/2024

## 2024-09-04 NOTE — ANESTHESIA PRE PROCEDURE
Department of Anesthesiology  Preprocedure Note       Name:  Leena Zuñiga   Age:  63 y.o.  :  1960                                          MRN:  [id number]         Date:  2024      Surgeon: Surgeon(s):  Jameson Fields MD    Procedure: Procedure(s):  ESOPHAGOGASTRODUODENOSCOPY    Medications prior to admission:   Prior to Admission medications    Medication Sig Start Date End Date Taking? Authorizing Provider   dilTIAZem (CARDIZEM 12 HR) 120 MG extended release capsule Take 1 capsule by mouth 2 times daily 24 Yes Caryn Damon APRN - CNP   meloxicam (MOBIC) 15 MG tablet Take 1 tablet by mouth daily 24  Yes Caryn Damon APRN - CNP   Cyanocobalamin (B-12) 3000 MCG LOZG  24  Yes Mor Rojas MD   gabapentin (NEURONTIN) 300 MG capsule take 1 capsule by mouth every morning then 1 capsule IN THE AFTERNOON and 2 capsules at bedtime 24 Yes Caryn Damon APRN - CNP   pantoprazole (PROTONIX) 40 MG tablet Take 1 tablet by mouth daily 24 Yes Caryn Damon APRN - CNP   aspirin 81 MG tablet Take 1 tablet by mouth daily   Yes Mor Rojas MD   ascorbic acid (VITAMIN C) 500 MG tablet Take 2 tablets by mouth every morning  Patient not taking: Reported on 2024    Mor Rojas MD   vitamin D (CHOLECALCIFEROL) 50 MCG (2000 UT) CAPS capsule Take 1 capsule by mouth every morning  Patient not taking: Reported on 2024    Provider, Historical, MD   Handicap Placard MISC by Does not apply route x5 years 10/4/23   Caryn Damon APRN - CNP       Current medications:    Current Facility-Administered Medications   Medication Dose Route Frequency Provider Last Rate Last Admin    cefTRIAXone (ROCEPHIN) 2,000 mg in sodium chloride 0.9 % 50 mL IVPB (mini-bag)  2,000 mg IntraVENous Q24H Durga Romo MD        metroNIDAZOLE (FLAGYL) 500 mg in 0.9% NaCl 100 mL IVPB premix  500 mg IntraVENous Q8H Durga Romo MD        acetaminophen

## 2024-09-04 NOTE — INTERVAL H&P NOTE
Update History & Physical      EGD with bile gastritis but no reason for Strep bacteremia/liver abscesses.  With anemia, colonoscopy planned.    Electronically signed by Jameson Fields MD on 9/4/2024 at 2:46 PM

## 2024-09-04 NOTE — CARE COORDINATION
9/4/24, 3:10 PM EDT    DISCHARGE ON GOING EVALUATION    [facility] day: 4  Location: -05/005-A Reason for admit: Acute cystitis with hematuria [N30.01]  Acute respiratory failure with hypoxia (HCC) [J96.01]  Pneumonia of both lower lobes due to infectious organism [J18.9]  Pneumonia due to infectious organism [J18.9]     Procedures: 9/4 EGD with bx: moderate bile gastritis  9/3 aspiration of lesion of left lobe of liver, 6mL purulent material    Imaging since last note: n/a     Barriers to Discharge: Hgb 9.9, WBC 14.6. GI planning colonoscopy tomorrow. IV rocephin, IV flagyl in use. ID following cx results for plan.     PCP: Caryn Damon APRN - CNP  Readmission Risk Score: 8.8    Patient Goals/Plan/Treatment Preferences: Home with , denies needs.

## 2024-09-05 ENCOUNTER — ANESTHESIA EVENT (OUTPATIENT)
Dept: ENDOSCOPY | Age: 64
End: 2024-09-05
Payer: COMMERCIAL

## 2024-09-05 ENCOUNTER — ANESTHESIA (OUTPATIENT)
Dept: ENDOSCOPY | Age: 64
End: 2024-09-05
Payer: COMMERCIAL

## 2024-09-05 PROBLEM — D72.829 LEUKOCYTOSIS: Status: ACTIVE | Noted: 2024-09-05

## 2024-09-05 PROBLEM — Z86.79 HISTORY OF ATRIAL FIBRILLATION: Status: ACTIVE | Noted: 2024-09-05

## 2024-09-05 PROBLEM — R74.8 ELEVATED ALKALINE PHOSPHATASE LEVEL: Status: ACTIVE | Noted: 2024-09-05

## 2024-09-05 PROBLEM — R10.10 UPPER ABDOMINAL PAIN: Status: ACTIVE | Noted: 2024-09-05

## 2024-09-05 PROBLEM — M48.04 THORACIC SPINAL STENOSIS: Status: ACTIVE | Noted: 2024-09-05

## 2024-09-05 PROBLEM — K76.9 LIVER LESION: Status: ACTIVE | Noted: 2024-09-05

## 2024-09-05 PROBLEM — N39.0 URINARY TRACT INFECTION WITHOUT HEMATURIA: Status: ACTIVE | Noted: 2024-09-05

## 2024-09-05 PROBLEM — Z72.0 TOBACCO ABUSE: Status: ACTIVE | Noted: 2024-09-05

## 2024-09-05 PROBLEM — R10.13 EPIGASTRIC PAIN: Status: ACTIVE | Noted: 2024-09-05

## 2024-09-05 PROBLEM — N17.9 AKI (ACUTE KIDNEY INJURY) (HCC): Status: ACTIVE | Noted: 2024-09-05

## 2024-09-05 PROBLEM — R65.20 SEPSIS WITH ACUTE HYPOXIC RESPIRATORY FAILURE WITHOUT SEPTIC SHOCK (HCC): Status: ACTIVE | Noted: 2024-09-05

## 2024-09-05 PROBLEM — A41.9 SEPTICEMIA (HCC): Status: ACTIVE | Noted: 2024-09-05

## 2024-09-05 PROBLEM — K75.0 HEPATIC ABSCESS: Status: ACTIVE | Noted: 2024-09-05

## 2024-09-05 PROBLEM — E66.9 OBESITY (BMI 30-39.9): Status: ACTIVE | Noted: 2024-09-05

## 2024-09-05 PROBLEM — J96.01 SEPSIS WITH ACUTE HYPOXIC RESPIRATORY FAILURE WITHOUT SEPTIC SHOCK (HCC): Status: ACTIVE | Noted: 2024-09-05

## 2024-09-05 LAB
AFP SERPL-MCNC: < 1.8 UG/L
ALBUMIN SERPL BCG-MCNC: 3.2 G/DL (ref 3.5–5.1)
ALP SERPL-CCNC: 148 U/L (ref 38–126)
ALT SERPL W/O P-5'-P-CCNC: 10 U/L (ref 11–66)
ANION GAP SERPL CALC-SCNC: 12 MEQ/L (ref 8–16)
AST SERPL-CCNC: 14 U/L (ref 5–40)
BACTERIA BLD AEROBE CULT: ABNORMAL
BASOPHILS ABSOLUTE: 0.1 THOU/MM3 (ref 0–0.1)
BASOPHILS NFR BLD AUTO: 0.4 %
BILIRUB SERPL-MCNC: 0.2 MG/DL (ref 0.3–1.2)
BUN SERPL-MCNC: 13 MG/DL (ref 7–22)
CALCIUM SERPL-MCNC: 9.2 MG/DL (ref 8.5–10.5)
CHLORIDE SERPL-SCNC: 106 MEQ/L (ref 98–111)
CO2 SERPL-SCNC: 24 MEQ/L (ref 23–33)
CREAT SERPL-MCNC: 0.5 MG/DL (ref 0.4–1.2)
DEPRECATED RDW RBC AUTO: 44.4 FL (ref 35–45)
EOSINOPHIL NFR BLD AUTO: 1 %
EOSINOPHILS ABSOLUTE: 0.2 THOU/MM3 (ref 0–0.4)
ERYTHROCYTE [DISTWIDTH] IN BLOOD BY AUTOMATED COUNT: 13.6 % (ref 11.5–14.5)
GFR SERPL CREATININE-BSD FRML MDRD: > 90 ML/MIN/1.73M2
GLUCOSE SERPL-MCNC: 87 MG/DL (ref 70–108)
HCT VFR BLD AUTO: 32.5 % (ref 37–47)
HGB BLD-MCNC: 10.5 GM/DL (ref 12–16)
IMM GRANULOCYTES # BLD AUTO: 0.12 THOU/MM3 (ref 0–0.07)
IMM GRANULOCYTES NFR BLD AUTO: 0.8 %
LYMPHOCYTES ABSOLUTE: 2.9 THOU/MM3 (ref 1–4.8)
LYMPHOCYTES NFR BLD AUTO: 18.2 %
MCH RBC QN AUTO: 28.5 PG (ref 26–33)
MCHC RBC AUTO-ENTMCNC: 32.3 GM/DL (ref 32.2–35.5)
MCV RBC AUTO: 88.1 FL (ref 81–99)
MONOCYTES ABSOLUTE: 0.9 THOU/MM3 (ref 0.4–1.3)
MONOCYTES NFR BLD AUTO: 5.8 %
NEUTROPHILS ABSOLUTE: 11.7 THOU/MM3 (ref 1.8–7.7)
NEUTROPHILS NFR BLD AUTO: 73.8 %
NRBC BLD AUTO-RTO: 0 /100 WBC
ORGANISM: ABNORMAL
ORGANISM: ABNORMAL
PLATELET # BLD AUTO: 438 THOU/MM3 (ref 130–400)
PMV BLD AUTO: 10.2 FL (ref 9.4–12.4)
POTASSIUM SERPL-SCNC: 3.9 MEQ/L (ref 3.5–5.2)
PROT SERPL-MCNC: 6.7 G/DL (ref 6.1–8)
RBC # BLD AUTO: 3.69 MILL/MM3 (ref 4.2–5.4)
SODIUM SERPL-SCNC: 142 MEQ/L (ref 135–145)
WBC # BLD AUTO: 15.8 THOU/MM3 (ref 4.8–10.8)

## 2024-09-05 PROCEDURE — 80053 COMPREHEN METABOLIC PANEL: CPT

## 2024-09-05 PROCEDURE — 2580000003 HC RX 258

## 2024-09-05 PROCEDURE — 1200000000 HC SEMI PRIVATE

## 2024-09-05 PROCEDURE — 0DBE8ZX EXCISION OF LARGE INTESTINE, VIA NATURAL OR ARTIFICIAL OPENING ENDOSCOPIC, DIAGNOSTIC: ICD-10-PCS | Performed by: INTERNAL MEDICINE

## 2024-09-05 PROCEDURE — 6360000002 HC RX W HCPCS: Performed by: NURSE ANESTHETIST, CERTIFIED REGISTERED

## 2024-09-05 PROCEDURE — 2709999900 HC NON-CHARGEABLE SUPPLY: Performed by: INTERNAL MEDICINE

## 2024-09-05 PROCEDURE — 6370000000 HC RX 637 (ALT 250 FOR IP): Performed by: INTERNAL MEDICINE

## 2024-09-05 PROCEDURE — 36415 COLL VENOUS BLD VENIPUNCTURE: CPT

## 2024-09-05 PROCEDURE — 7100000010 HC PHASE II RECOVERY - FIRST 15 MIN: Performed by: INTERNAL MEDICINE

## 2024-09-05 PROCEDURE — 7100000011 HC PHASE II RECOVERY - ADDTL 15 MIN: Performed by: INTERNAL MEDICINE

## 2024-09-05 PROCEDURE — 2580000003 HC RX 258: Performed by: STUDENT IN AN ORGANIZED HEALTH CARE EDUCATION/TRAINING PROGRAM

## 2024-09-05 PROCEDURE — 2500000003 HC RX 250 WO HCPCS: Performed by: NURSE ANESTHETIST, CERTIFIED REGISTERED

## 2024-09-05 PROCEDURE — 3700000000 HC ANESTHESIA ATTENDED CARE: Performed by: INTERNAL MEDICINE

## 2024-09-05 PROCEDURE — 85025 COMPLETE CBC W/AUTO DIFF WBC: CPT

## 2024-09-05 PROCEDURE — 3700000001 HC ADD 15 MINUTES (ANESTHESIA): Performed by: INTERNAL MEDICINE

## 2024-09-05 PROCEDURE — 3609010300 HC COLONOSCOPY W/BIOPSY SINGLE/MULTIPLE: Performed by: INTERNAL MEDICINE

## 2024-09-05 PROCEDURE — 3609010600 HC COLONOSCOPY POLYPECTOMY SNARE/COLD BIOPSY: Performed by: INTERNAL MEDICINE

## 2024-09-05 PROCEDURE — 0DBP8ZZ EXCISION OF RECTUM, VIA NATURAL OR ARTIFICIAL OPENING ENDOSCOPIC: ICD-10-PCS | Performed by: INTERNAL MEDICINE

## 2024-09-05 PROCEDURE — 88305 TISSUE EXAM BY PATHOLOGIST: CPT

## 2024-09-05 PROCEDURE — 6360000002 HC RX W HCPCS: Performed by: STUDENT IN AN ORGANIZED HEALTH CARE EDUCATION/TRAINING PROGRAM

## 2024-09-05 PROCEDURE — 99233 SBSQ HOSP IP/OBS HIGH 50: CPT | Performed by: STUDENT IN AN ORGANIZED HEALTH CARE EDUCATION/TRAINING PROGRAM

## 2024-09-05 PROCEDURE — 6370000000 HC RX 637 (ALT 250 FOR IP)

## 2024-09-05 PROCEDURE — 99232 SBSQ HOSP IP/OBS MODERATE 35: CPT | Performed by: INTERNAL MEDICINE

## 2024-09-05 RX ORDER — PROPOFOL 10 MG/ML
INJECTION, EMULSION INTRAVENOUS PRN
Status: DISCONTINUED | OUTPATIENT
Start: 2024-09-05 | End: 2024-09-05 | Stop reason: SDUPTHER

## 2024-09-05 RX ORDER — LIDOCAINE HYDROCHLORIDE 20 MG/ML
INJECTION, SOLUTION INFILTRATION; PERINEURAL PRN
Status: DISCONTINUED | OUTPATIENT
Start: 2024-09-05 | End: 2024-09-05 | Stop reason: SDUPTHER

## 2024-09-05 RX ADMIN — PROPOFOL 25 MG: 10 INJECTION, EMULSION INTRAVENOUS at 13:42

## 2024-09-05 RX ADMIN — PROPOFOL 25 MG: 10 INJECTION, EMULSION INTRAVENOUS at 13:32

## 2024-09-05 RX ADMIN — GABAPENTIN 600 MG: 300 CAPSULE ORAL at 20:54

## 2024-09-05 RX ADMIN — GABAPENTIN 300 MG: 300 CAPSULE ORAL at 08:14

## 2024-09-05 RX ADMIN — PROPOFOL 50 MG: 10 INJECTION, EMULSION INTRAVENOUS at 13:36

## 2024-09-05 RX ADMIN — PROPOFOL 25 MG: 10 INJECTION, EMULSION INTRAVENOUS at 13:30

## 2024-09-05 RX ADMIN — PANTOPRAZOLE SODIUM 40 MG: 40 TABLET, DELAYED RELEASE ORAL at 08:14

## 2024-09-05 RX ADMIN — ACETAMINOPHEN 650 MG: 325 TABLET ORAL at 06:12

## 2024-09-05 RX ADMIN — DILTIAZEM HYDROCHLORIDE 120 MG: 60 CAPSULE, EXTENDED RELEASE ORAL at 08:14

## 2024-09-05 RX ADMIN — PROPOFOL 50 MG: 10 INJECTION, EMULSION INTRAVENOUS at 13:48

## 2024-09-05 RX ADMIN — METRONIDAZOLE 500 MG: 500 INJECTION, SOLUTION INTRAVENOUS at 06:15

## 2024-09-05 RX ADMIN — PROPOFOL 25 MG: 10 INJECTION, EMULSION INTRAVENOUS at 13:54

## 2024-09-05 RX ADMIN — SUCRALFATE 1 G: 1 TABLET ORAL at 06:10

## 2024-09-05 RX ADMIN — SUCRALFATE 1 G: 1 TABLET ORAL at 16:17

## 2024-09-05 RX ADMIN — SODIUM CHLORIDE, PRESERVATIVE FREE 10 ML: 5 INJECTION INTRAVENOUS at 20:56

## 2024-09-05 RX ADMIN — CEFTRIAXONE SODIUM 2000 MG: 2 INJECTION, POWDER, FOR SOLUTION INTRAMUSCULAR; INTRAVENOUS at 21:15

## 2024-09-05 RX ADMIN — PROPOFOL 25 MG: 10 INJECTION, EMULSION INTRAVENOUS at 13:26

## 2024-09-05 RX ADMIN — PROPOFOL 50 MG: 10 INJECTION, EMULSION INTRAVENOUS at 13:40

## 2024-09-05 RX ADMIN — LIDOCAINE HYDROCHLORIDE 100 MG: 20 INJECTION, SOLUTION INFILTRATION; PERINEURAL at 13:26

## 2024-09-05 RX ADMIN — METRONIDAZOLE 500 MG: 500 INJECTION, SOLUTION INTRAVENOUS at 16:16

## 2024-09-05 RX ADMIN — SUCRALFATE 1 G: 1 TABLET ORAL at 20:54

## 2024-09-05 RX ADMIN — PROPOFOL 50 MG: 10 INJECTION, EMULSION INTRAVENOUS at 13:38

## 2024-09-05 RX ADMIN — PROPOFOL 25 MG: 10 INJECTION, EMULSION INTRAVENOUS at 13:24

## 2024-09-05 RX ADMIN — PROPOFOL 50 MG: 10 INJECTION, EMULSION INTRAVENOUS at 13:50

## 2024-09-05 RX ADMIN — PROPOFOL 25 MG: 10 INJECTION, EMULSION INTRAVENOUS at 13:28

## 2024-09-05 RX ADMIN — DILTIAZEM HYDROCHLORIDE 120 MG: 60 CAPSULE, EXTENDED RELEASE ORAL at 20:54

## 2024-09-05 RX ADMIN — SODIUM CHLORIDE: 9 INJECTION, SOLUTION INTRAVENOUS at 13:23

## 2024-09-05 RX ADMIN — METRONIDAZOLE 500 MG: 500 INJECTION, SOLUTION INTRAVENOUS at 22:04

## 2024-09-05 RX ADMIN — PROPOFOL 50 MG: 10 INJECTION, EMULSION INTRAVENOUS at 13:34

## 2024-09-05 RX ADMIN — PROPOFOL 50 MG: 10 INJECTION, EMULSION INTRAVENOUS at 13:44

## 2024-09-05 RX ADMIN — SODIUM CHLORIDE, PRESERVATIVE FREE 10 ML: 5 INJECTION INTRAVENOUS at 08:14

## 2024-09-05 RX ADMIN — PROPOFOL 50 MG: 10 INJECTION, EMULSION INTRAVENOUS at 13:46

## 2024-09-05 ASSESSMENT — PAIN - FUNCTIONAL ASSESSMENT
PAIN_FUNCTIONAL_ASSESSMENT: NONE - DENIES PAIN

## 2024-09-05 ASSESSMENT — PAIN SCALES - GENERAL: PAINLEVEL_OUTOF10: 3

## 2024-09-05 ASSESSMENT — LIFESTYLE VARIABLES: SMOKING_STATUS: 1

## 2024-09-05 NOTE — PROGRESS NOTES
Scope # .  Colonoscopy completed, tolerated well. 3 biopsies taken with cold forceps, 1 polyp removed with cold snare, 4 jars labeled and sent to lab for processing. Photos taken.

## 2024-09-05 NOTE — PROGRESS NOTES
PROGRESS NOTE      Patient:  Leena Zuñiga  Unit/Bed:-05/005-A  YOB: 1960  MRN: [id number]   Acct: [id number]    PCP: Caryn Damon APRN - CNP    Date of Admission: 8/31/2024 LOS: 5    Date of Evaluation:  9/5/2024    Anticipated Discharge: Pending clinical status/evaluation of infection    Assessment/Plan:    Acute hypoxic respiratory failure, improved  -Episode of 88% on room air SpO2 in the ER, started on 2lpm O2 via NC  -Likely related to bilateral lower lung atelectasis/infiltrate  -Continue ceftriaxone 2 g daily  -Weaned O2 on 9/1, no longer on supplemental O2    Sepsis with leukocytosis, tachycardia, lactic acidosis, fever with gram (+) bacteremia (prelim strep species) with uncertain etiology- unlikely UTI, possible liver abscess  -SIRS 3/4, with fever, tachycardia, leukocytosis on arrival 19.5 (8/31)  -Investigating etiology, b/L lower lung atelectasis/infiltrate, confirmed UTI, liver abscess  -Lactate 0.62 (8/31), repeat lactic acid 0.6 on (9/2)  -Inflammatory markers (ESR 90 and CRP 25.75) elevated 8/31  -Blood cultures x2 on arrival positive for strep species   -Abscess confirmed, ?disseminated strep  -ID following, appreciate recommendations  - Repeated blood cultures x2, negative for growth 9/3   - IR procedure (CT guided aspiration of abscess) 9/3 noted 6mL of thick green fluid removed, sample sent to path   -Culture from hepatic abscess positive for gram positive cocci,   -Confirmed strep intermedius   -EGD negative for PUD, unclear source of infection   -Continue Rocephin 2 g IV and metronidazole 500 mg IV as per ID, will swap to po augmentin depending on OLGA results   -OLGA ordered 9/5 as per ID, npo at midnight    Lower left hepatic lobe abscess, possible hemangioma in dome of right hepatic lobe  -MRI 9/1 showed 3 x 2.1 cm probable hemangioma in the dome of the right lobe of the liver, 4.8 x 3.6 cm and 3.1 x 3.1 cm areas in the left lobe of the liver with abnormal  enhancement, metastatic disease cannot be ruled out.  - As per ID can be a superimposed abscess on malignancy.  - Checking AFP, in process. ?poss underlying malignancy  -Elevated alk phos throughout hospitalization, 164 on 9/3, other LFT's wnl  -IR procedure 9/3 (CT guided aspiration of abscess) collected a sample from the left hepatic lobe abscess  -Post op indicated lesion was significantly smaller.  -Will continue to monitor LFT's    UTI with E. Coli  -UTI confirmed by urinalysis, patient denies dysuria, suprapubic pain, flank pain, urinary frequency.  -Urine culture positive for E. coli  -On Rocephin 2 g IV  -Possible infectious etiology, will continue to investigate alternate etiologies    Possible pneumonia of BLL likely due to gram (+) organism  -Noted on CXR 8/31, patient endorses lower left thoracic pain  -On Rocephin 2 g IV (started 8/31, ending 9/4)  -Pneumonia panel pending, sputum culture pending  -Has not been tachypneic since admission 8/31, will continue to monitor SpO2 and symptoms    Left upper quadrant pain, likely due to moderate bile gastritis  -Believed to be gastritis 2 weeks ago, has been taking PPI for the last 2 weeks, has been occurring for the last few months.  -No resolution of symptoms, possible peptic ulcer disease  -Possible route for dissemination of strep  -GI following, appreciate recommendations  -EGD 9/4 noted moderate bile gastritis with hematin, h pylori biopsy pending.   -Sucralfate 1g tablet po qachs    Leukocytosis  -19.5 on admission 8/31, trending down, at 15.8 on 9/5  -Left shift noted  -Investigating cause of infection, see above    Hypoalbuminemia  -2.8 on admission 8/31, 3.2 on 9/5  -?Related to liver abnormalities  -UA showed greater than equal to 300 proteinuria 8/31, UTI confirmed  -No edema noted.    Normocytic anemia, secondary to iron deficiency  -SHANNON with dilutional component.  -Hemoglobin 11.8 on admission 8/31, 10.5 on 9/5  -Ferritin 517, iron 16, TIBC  and reactive to light.   Cardiovascular:      Rate and Rhythm: Normal rate and regular rhythm.      Pulses: Normal pulses.      Heart sounds: Normal heart sounds.   Pulmonary:      Effort: Pulmonary effort is normal. No respiratory distress.      Breath sounds: Normal breath sounds. No wheezing or rhonchi.   Chest:      Chest wall: No tenderness.   Abdominal:      General: Abdomen is flat. Bowel sounds are normal.      Palpations: Abdomen is soft.   Musculoskeletal:         General: Normal range of motion.      Cervical back: Normal range of motion and neck supple.   Skin:     General: Skin is warm and dry.   Neurological:      General: No focal deficit present.      Mental Status: She is alert and oriented to person, place, and time. Mental status is at baseline.   Psychiatric:         Mood and Affect: Mood normal.         Behavior: Behavior normal.         Thought Content: Thought content normal.         Judgment: Judgment normal.        All labs reviewed and interpreted by me:  Labs:   Recent Labs     09/03/24 0638 09/04/24  0546 09/05/24  0558   WBC 15.7* 14.6* 15.8*   HGB 10.1* 9.9* 10.5*   HCT 30.7* 29.5* 32.5*    379 438*     Recent Labs     09/03/24  0638 09/04/24  0546    139   K 3.9 3.4*    102   CO2 23 23   BUN 11 13   CREATININE 0.6 0.5   CALCIUM 8.9 8.7     Recent Labs     09/03/24  0638 09/04/24  0546   AST 11 12   ALT 10* 10*   BILITOT 0.3 0.2*   ALKPHOS 164* 144*     No results for input(s): \"INR\" in the last 72 hours.  No results for input(s): \"CKTOTAL\", \"TROPONINT\" in the last 72 hours.    Urinalysis:      Lab Results   Component Value Date/Time    NITRU POSITIVE 08/31/2024 05:16 PM    WBCUA 50-75 08/31/2024 05:16 PM    BACTERIA MANY 08/31/2024 05:16 PM    RBCUA 3-5 08/31/2024 05:16 PM    BLOODU SMALL 08/31/2024 05:16 PM    SPECGRAV 1.010 05/24/2022 03:41 PM    GLUCOSEU NEGATIVE 08/31/2024 05:16 PM       All radiology images and reports reviewed and interpreted by

## 2024-09-05 NOTE — PROGRESS NOTES
Recovery mode. Denies discomfort.  discussed findings and plan of care with patient and family. Report called to primary nurse, Chelsea ABERNATHY.

## 2024-09-05 NOTE — ANESTHESIA POSTPROCEDURE EVALUATION
Department of Anesthesiology  Postprocedure Note    Patient: Leena Zuñiga  MRN: [id number]  YOB: 1960  Date of evaluation: 9/5/2024    Procedure Summary       Date: 09/05/24 Room / Location: [place] [address] / [facility]    Anesthesia Start: 1323 Anesthesia Stop: 1356    Procedures:       COLONOSCOPY BIOPSY      COLONOSCOPY POLYPECTOMY SNARE/BIOPSY Diagnosis:       Upper GI bleed      (Upper GI bleed [K92.2])    Surgeons: Jameson Fields MD Responsible Provider: Feliz Soto DO    Anesthesia Type: MAC ASA Status: 2            Anesthesia Type: No value filed.    Gloria Phase I: Gloria Score: 9    Gloria Phase II: Gloria Score: 10    Anesthesia Post Evaluation    No notable events documented.

## 2024-09-05 NOTE — PROGRESS NOTES
[facility] Infectious Disease         Progress Note      Leena Zuñiga  MEDICAL RECORD NUMBER:  [id number]  AGE: 63 y.o.   GENDER: female  : 1960  EPISODE DATE:  2024      Assessment:     Pt is a 63yof I am consulted for strep septicemia in setting of liver lesion and epigastric pain. Currently on LEBLANC and CTX.     The typical etiologies of strep infections are dental in nature with translocation, cutaneous spread, primary GI disease (colonization in GI tract). She does have epigastric pain that appears c/w a potential gastric ulcer (not improved with pepcid). She also has hepatic enhancement of unclear etiology (?malignancy, benign mass or infection).      The HANDOC score is 4 which does require evaluation with TTE for possible vegetation. The strep is pcn S, I would recommend high dose amox clav given poor beta lactam penetration into serum.      - Recommend TTE today  - If negative, plan for transition to high amox clav (875 tid)  - Cover for 14 days from initial blood culture  - Then transition to augmentin bid to complete hepatic abscess treatment for 4 weeks   - Will need repeat imaging prior to dc      Subjective:     Chief Complaint   Patient presents with    Fever    Generalized Body Aches         Pt w/ NAEON. I would continue ctx/leblanc for now. When ready for discharge, I have a plan for oral.     She is overall doing well today and has no complaints/concerns.     Patient Active Problem List   Diagnosis Code    Paroxysmal atrial fibrillation (HCC) I48.0    Stenosis of cervical spine M48.02    Traumatic edema of cervical spinal cord ([facility]) S14.0XXA    Degeneration of intervertebral disc at C4-C5 level M50.321    DDD (degenerative disc disease), lumbar M51.36    Hx of fusion of cervical spine Z98.1    Polyarthralgia M25.50    Pneumonia due to infectious organism J18.9    Acute respiratory failure with hypoxia ([facility]) J96.01             PAST MEDICAL HISTORY        Diagnosis Date    Atrial

## 2024-09-05 NOTE — ANESTHESIA PRE PROCEDURE
Every Day     Current packs/day: 0.50     Average packs/day: 0.5 packs/day for 44.7 years (22.3 ttl pk-yrs)     Types: Cigarettes     Start date: 1/1/1980    Smokeless tobacco: Former   Substance Use Topics    Alcohol use: Yes     Comment: recreational                                Ready to quit: Not Answered  Counseling given: Not Answered      Vital Signs (Current):   Vitals:    09/05/24 0418 09/05/24 0809 09/05/24 1129 09/05/24 1220   BP: 131/68 117/82 116/74 124/77   Pulse: 74 66 65 73   Resp:  18 18 16   Temp: 36.4 °C (97.5 °F) 36.8 °C (98.2 °F) 37.1 °C (98.7 °F)    TempSrc: Oral Oral Oral    SpO2: 94% 95% 94% 94%   Weight: 82.3 kg (181 lb 7 oz)      Height:                                                  BP Readings from Last 3 Encounters:   09/05/24 124/77   08/21/24 124/82   07/24/24 138/88       NPO Status: Time of last liquid consumption: 2000                        Time of last solid consumption: 1500                        Date of last liquid consumption: 09/04/24                        Date of last solid food consumption: 09/04/24    BMI:   Wt Readings from Last 3 Encounters:   09/05/24 82.3 kg (181 lb 7 oz)   08/21/24 80.3 kg (177 lb)   12/13/23 77.6 kg (171 lb)     Body mass index is 33.19 kg/m².    CBC:   Lab Results   Component Value Date/Time    WBC 15.8 09/05/2024 05:58 AM    RBC 3.69 09/05/2024 05:58 AM    RBC 4.94 09/05/2011 12:20 PM    HGB 10.5 09/05/2024 05:58 AM    HGB 14.6 09/05/2011 12:20 PM    HCT 32.5 09/05/2024 05:58 AM    MCV 88.1 09/05/2024 05:58 AM    RDW 13.4 08/31/2024 04:35 PM     09/05/2024 05:58 AM       CMP:   Lab Results   Component Value Date/Time     09/05/2024 05:58 AM    K 3.9 09/05/2024 05:58 AM    K 3.9 01/19/2023 03:28 PM     09/05/2024 05:58 AM    CO2 24 09/05/2024 05:58 AM    BUN 13 09/05/2024 05:58 AM    CREATININE 0.5 09/05/2024 05:58 AM    LABGLOM > 90 09/05/2024 05:58 AM    LABGLOM >60 01/19/2023 03:28 PM    GLUCOSE 87 09/05/2024 05:58 AM

## 2024-09-05 NOTE — PLAN OF CARE
Problem: Discharge Planning  Goal: Discharge to home or other facility with appropriate resources  Outcome: Progressing  Flowsheets (Taken 9/4/2024 0904 by Chelsea Richardson, RN)  Discharge to home or other facility with appropriate resources: Identify barriers to discharge with patient and caregiver     Problem: Pain  Goal: Verbalizes/displays adequate comfort level or baseline comfort level  Outcome: Progressing  Flowsheets (Taken 9/4/2024 0904 by Chelsea Richardson, RN)  Verbalizes/displays adequate comfort level or baseline comfort level: Encourage patient to monitor pain and request assistance     Problem: Safety - Adult  Goal: Free from fall injury  Outcome: Progressing  Flowsheets (Taken 9/1/2024 1215 by Derek Marsh, RN)  Free From Fall Injury: Instruct family/caregiver on patient safety

## 2024-09-06 ENCOUNTER — APPOINTMENT (OUTPATIENT)
Dept: CT IMAGING | Age: 64
DRG: 871 | End: 2024-09-06
Payer: COMMERCIAL

## 2024-09-06 ENCOUNTER — APPOINTMENT (OUTPATIENT)
Age: 64
DRG: 871 | End: 2024-09-06
Payer: COMMERCIAL

## 2024-09-06 VITALS
WEIGHT: 180.56 LBS | HEART RATE: 69 BPM | DIASTOLIC BLOOD PRESSURE: 82 MMHG | TEMPERATURE: 98.5 F | SYSTOLIC BLOOD PRESSURE: 133 MMHG | RESPIRATION RATE: 16 BRPM | OXYGEN SATURATION: 92 % | BODY MASS INDEX: 33.23 KG/M2 | HEIGHT: 62 IN

## 2024-09-06 LAB
ALBUMIN SERPL BCG-MCNC: 2.9 G/DL (ref 3.5–5.1)
ALP SERPL-CCNC: 125 U/L (ref 38–126)
ALT SERPL W/O P-5'-P-CCNC: 7 U/L (ref 11–66)
ANION GAP SERPL CALC-SCNC: 11 MEQ/L (ref 8–16)
AST SERPL-CCNC: 10 U/L (ref 5–40)
BASOPHILS ABSOLUTE: 0.1 THOU/MM3 (ref 0–0.1)
BASOPHILS NFR BLD AUTO: 0.4 %
BILIRUB SERPL-MCNC: 0.2 MG/DL (ref 0.3–1.2)
BUN SERPL-MCNC: 13 MG/DL (ref 7–22)
CALCIUM SERPL-MCNC: 8.7 MG/DL (ref 8.5–10.5)
CHLORIDE SERPL-SCNC: 107 MEQ/L (ref 98–111)
CO2 SERPL-SCNC: 25 MEQ/L (ref 23–33)
CREAT SERPL-MCNC: 0.5 MG/DL (ref 0.4–1.2)
DEPRECATED RDW RBC AUTO: 43.5 FL (ref 35–45)
ECHO BSA: 1.86 M2
EOSINOPHIL NFR BLD AUTO: 1 %
EOSINOPHILS ABSOLUTE: 0.1 THOU/MM3 (ref 0–0.4)
ERYTHROCYTE [DISTWIDTH] IN BLOOD BY AUTOMATED COUNT: 13.5 % (ref 11.5–14.5)
GFR SERPL CREATININE-BSD FRML MDRD: > 90 ML/MIN/1.73M2
GLUCOSE SERPL-MCNC: 94 MG/DL (ref 70–108)
HCT VFR BLD AUTO: 31.1 % (ref 37–47)
HGB BLD-MCNC: 10.1 GM/DL (ref 12–16)
IMM GRANULOCYTES # BLD AUTO: 0.17 THOU/MM3 (ref 0–0.07)
IMM GRANULOCYTES NFR BLD AUTO: 1.2 %
LYMPHOCYTES ABSOLUTE: 2.9 THOU/MM3 (ref 1–4.8)
LYMPHOCYTES NFR BLD AUTO: 20.3 %
MAGNESIUM SERPL-MCNC: 1.8 MG/DL (ref 1.6–2.4)
MCH RBC QN AUTO: 28.5 PG (ref 26–33)
MCHC RBC AUTO-ENTMCNC: 32.5 GM/DL (ref 32.2–35.5)
MCV RBC AUTO: 87.9 FL (ref 81–99)
MONOCYTES ABSOLUTE: 0.7 THOU/MM3 (ref 0.4–1.3)
MONOCYTES NFR BLD AUTO: 5.2 %
NEUTROPHILS ABSOLUTE: 10.2 THOU/MM3 (ref 1.8–7.7)
NEUTROPHILS NFR BLD AUTO: 71.9 %
NRBC BLD AUTO-RTO: 0 /100 WBC
PLATELET # BLD AUTO: 468 THOU/MM3 (ref 130–400)
PMV BLD AUTO: 9.7 FL (ref 9.4–12.4)
POTASSIUM SERPL-SCNC: 3.9 MEQ/L (ref 3.5–5.2)
PROT SERPL-MCNC: 6 G/DL (ref 6.1–8)
RBC # BLD AUTO: 3.54 MILL/MM3 (ref 4.2–5.4)
SODIUM SERPL-SCNC: 143 MEQ/L (ref 135–145)
WBC # BLD AUTO: 14.2 THOU/MM3 (ref 4.8–10.8)

## 2024-09-06 PROCEDURE — 6370000000 HC RX 637 (ALT 250 FOR IP)

## 2024-09-06 PROCEDURE — 74177 CT ABD & PELVIS W/CONTRAST: CPT

## 2024-09-06 PROCEDURE — 6370000000 HC RX 637 (ALT 250 FOR IP): Performed by: INTERNAL MEDICINE

## 2024-09-06 PROCEDURE — 7100000010 HC PHASE II RECOVERY - FIRST 15 MIN: Performed by: NUCLEAR MEDICINE

## 2024-09-06 PROCEDURE — 6360000002 HC RX W HCPCS: Performed by: STUDENT IN AN ORGANIZED HEALTH CARE EDUCATION/TRAINING PROGRAM

## 2024-09-06 PROCEDURE — 93312 ECHO TRANSESOPHAGEAL: CPT | Performed by: NUCLEAR MEDICINE

## 2024-09-06 PROCEDURE — 99233 SBSQ HOSP IP/OBS HIGH 50: CPT | Performed by: STUDENT IN AN ORGANIZED HEALTH CARE EDUCATION/TRAINING PROGRAM

## 2024-09-06 PROCEDURE — 85025 COMPLETE CBC W/AUTO DIFF WBC: CPT

## 2024-09-06 PROCEDURE — 93325 DOPPLER ECHO COLOR FLOW MAPG: CPT

## 2024-09-06 PROCEDURE — B24BZZ4 ULTRASONOGRAPHY OF HEART WITH AORTA, TRANSESOPHAGEAL: ICD-10-PCS | Performed by: NUCLEAR MEDICINE

## 2024-09-06 PROCEDURE — 83735 ASSAY OF MAGNESIUM: CPT

## 2024-09-06 PROCEDURE — 80053 COMPREHEN METABOLIC PANEL: CPT

## 2024-09-06 PROCEDURE — 99223 1ST HOSP IP/OBS HIGH 75: CPT | Performed by: NUCLEAR MEDICINE

## 2024-09-06 PROCEDURE — 6360000002 HC RX W HCPCS: Performed by: NUCLEAR MEDICINE

## 2024-09-06 PROCEDURE — 2580000003 HC RX 258

## 2024-09-06 PROCEDURE — 93320 DOPPLER ECHO COMPLETE: CPT | Performed by: NUCLEAR MEDICINE

## 2024-09-06 PROCEDURE — 36415 COLL VENOUS BLD VENIPUNCTURE: CPT

## 2024-09-06 PROCEDURE — 99152 MOD SED SAME PHYS/QHP 5/>YRS: CPT | Performed by: NUCLEAR MEDICINE

## 2024-09-06 PROCEDURE — 99232 SBSQ HOSP IP/OBS MODERATE 35: CPT | Performed by: INTERNAL MEDICINE

## 2024-09-06 PROCEDURE — 6360000004 HC RX CONTRAST MEDICATION: Performed by: INTERNAL MEDICINE

## 2024-09-06 PROCEDURE — 93325 DOPPLER ECHO COLOR FLOW MAPG: CPT | Performed by: NUCLEAR MEDICINE

## 2024-09-06 RX ORDER — IOPAMIDOL 755 MG/ML
80 INJECTION, SOLUTION INTRAVASCULAR
Status: COMPLETED | OUTPATIENT
Start: 2024-09-06 | End: 2024-09-06

## 2024-09-06 RX ORDER — MIDAZOLAM HYDROCHLORIDE 1 MG/ML
INJECTION INTRAMUSCULAR; INTRAVENOUS PRN
Status: DISCONTINUED | OUTPATIENT
Start: 2024-09-06 | End: 2024-09-06 | Stop reason: ALTCHOICE

## 2024-09-06 RX ORDER — SUCRALFATE 1 G/1
1 TABLET ORAL
Qty: 120 TABLET | Refills: 0 | Status: SHIPPED | OUTPATIENT
Start: 2024-09-06

## 2024-09-06 RX ORDER — FENTANYL CITRATE 50 UG/ML
INJECTION, SOLUTION INTRAMUSCULAR; INTRAVENOUS PRN
Status: DISCONTINUED | OUTPATIENT
Start: 2024-09-06 | End: 2024-09-06 | Stop reason: ALTCHOICE

## 2024-09-06 RX ADMIN — DILTIAZEM HYDROCHLORIDE 120 MG: 60 CAPSULE, EXTENDED RELEASE ORAL at 08:24

## 2024-09-06 RX ADMIN — IOPAMIDOL 80 ML: 755 INJECTION, SOLUTION INTRAVENOUS at 15:39

## 2024-09-06 RX ADMIN — METRONIDAZOLE 500 MG: 500 INJECTION, SOLUTION INTRAVENOUS at 14:39

## 2024-09-06 RX ADMIN — SODIUM CHLORIDE, PRESERVATIVE FREE 10 ML: 5 INJECTION INTRAVENOUS at 08:25

## 2024-09-06 RX ADMIN — SUCRALFATE 1 G: 1 TABLET ORAL at 06:21

## 2024-09-06 RX ADMIN — SUCRALFATE 1 G: 1 TABLET ORAL at 17:05

## 2024-09-06 RX ADMIN — METRONIDAZOLE 500 MG: 500 INJECTION, SOLUTION INTRAVENOUS at 06:24

## 2024-09-06 RX ADMIN — PANTOPRAZOLE SODIUM 40 MG: 40 TABLET, DELAYED RELEASE ORAL at 08:25

## 2024-09-06 RX ADMIN — GABAPENTIN 300 MG: 300 CAPSULE ORAL at 08:24

## 2024-09-06 ASSESSMENT — PAIN - FUNCTIONAL ASSESSMENT
PAIN_FUNCTIONAL_ASSESSMENT: NONE - DENIES PAIN

## 2024-09-06 NOTE — PLAN OF CARE
Problem: Discharge Planning  Goal: Discharge to home or other facility with appropriate resources  Outcome: Progressing  Flowsheets (Taken 9/5/2024 0809 by Chelsea Richardson, RN)  Discharge to home or other facility with appropriate resources: Identify barriers to discharge with patient and caregiver     Problem: Pain  Goal: Verbalizes/displays adequate comfort level or baseline comfort level  Outcome: Progressing  Flowsheets (Taken 9/5/2024 1129 by Chelsea Richardson, RN)  Verbalizes/displays adequate comfort level or baseline comfort level: Encourage patient to monitor pain and request assistance     Problem: Safety - Adult  Goal: Free from fall injury  Outcome: Progressing  Flowsheets (Taken 9/1/2024 1215 by Derek Marsh, RN)  Free From Fall Injury: Instruct family/caregiver on patient safety

## 2024-09-06 NOTE — PROGRESS NOTES
Pt in phase 2 of recovery. Fully awake, denies pain.     Dr. Pagan spoke with patients family.     Report called to 6k Nurse Diogo

## 2024-09-06 NOTE — PROGRESS NOTES
Pt Name: Leena Zuñiag  MRN: [id number]  [id number]  YOB: 1960  Admit Date: 8/31/2024  4:22 PM  Date of evaluation: 9/6/2024  Primary Care Physician: Caryn Damon, DORA - CNP   STRZ ENDO POOL RM/NONE     ZARINA    S.  No complaints.      O.     Vitals:    09/06/24 1155   BP: (!) 142/85   Pulse: 70   Resp: 16   Temp:    SpO2: 92%       Body mass index is 33.02 kg/m².   Awake and alert   clear to auscultation bilaterally   regular rate and rhythm   Soft and nondistended with normal BS, nontender   no cyanosis, clubbing or edema present      Current Meds    cefTRIAXone (ROCEPHIN) IV  2,000 mg IntraVENous Q24H    metroNIDAZOLE  500 mg IntraVENous Q8H    sucralfate  1 g Oral 4x Daily AC & HS    acetaminophen  500 mg Oral Once    sodium chloride flush  5-40 mL IntraVENous 2 times per day    [Held by provider] aspirin  81 mg Oral Daily    dilTIAZem  120 mg Oral BID    gabapentin  300 mg Oral QAM    gabapentin  600 mg Oral Nightly    pantoprazole  40 mg Oral Daily      sodium chloride       Diet: Diet NPO    A.  63 y.o. female admitted 8/31/24 for leukocytosis, fever, malaise, dyspnea due to CAP.  Imaging demonstrates liver masses and ^LFT's.  Pt on Meloxicam for DDD.  She was started on ppi 2 weeks ago by [facility] ED.  She has early satiety and anorexia.      9/4/24-EGD demonstrate bile gastritis without reason for Strep bacteremia/liver abscesses.   9/5/24-colonoscopy:  removed polyp, otherwise normal, biopsied.  No source for SHANNON/Strep bacteremia/liver abscesses    Getting OLGA today  Getting CTE today    P.      Await OLGA   Await CTE     OK per GI to discharge, signing off    Jameson Fields MD  12:13 PM 9/6/2024

## 2024-09-06 NOTE — CARE COORDINATION
9/6/24, 4:07 PM EDT    DISCHARGE ON GOING EVALUATION    [facility] day: 6  Location: -05/005-A Reason for admit: Acute cystitis with hematuria [N30.01]  Acute respiratory failure with hypoxia (HCC) [J96.01]  Pneumonia of both lower lobes due to infectious organism [J18.9]  Pneumonia due to infectious organism [J18.9]     Procedures:   9/4 EGD with biopsy: moderate bile gastritis  9/3 Aspiration of lesion of left lobe of liver, 6mL purulent material  9/5 Colonoscopy, polypectomy with biopsy.     Imaging since last note:   9/6 CTE:   1. Little interval change is noted in the adjacent peripherally enhancing fluid attenuation lesions in the left hepatic lobe measure up to 29 x 41 mm with additional lesions described. The mucosal thickening within the body of the stomach and gastric antrum is most pronounced where the stomach borders the lesions in the lateral segment of the left hepatic lobe. Differential considerations include gastric infection/inflammation versus malignancy. Correlate with the recent liver lesion histopathology and consider further evaluation with EGD.  2. The remainder of the bowel is unremarkable. The small and large bowel loops are not dilated. No bowel obstruction, free fluid, fluid collection, or free air is visualized.  3. A probable benign hemangioma in the right hepatic lobe measures 31 x 34 mm, unchanged from the MRI examination performed recently. Please see this MRI for additional assessment and follow-up recommendations.    Barriers to Discharge: Hospitalist, cardiology, ID following. GI ok for discharge. OLGA today to r/o infective endocarditis.     PCP: Caryn Damon, DORA - CNP  Readmission Risk Score: 9.6    Patient Goals/Plan/Treatment Preferences: Home with , denies needs.      9/6/24, 4:14 PM EDT    Patient goals/plan/ treatment preferences discussed by  and .  Patient goals/plan/ treatment preferences reviewed with patient/  family.  Patient/ family verbalize understanding of discharge plan and are in agreement with goal/plan/treatment preferences.  Understanding was demonstrated using the teach back method.  AVS provided by RN at time of discharge, which includes all necessary medical information pertaining to the patients current course of illness, treatment, post-discharge goals of care, and treatment preferences.     Services At/After Discharge: None    Anticipate weekend discharge.

## 2024-09-06 NOTE — CONSULTS
[facility]  Consult    Patient's Name/Date of Birth: Leena Zuñiga / 1960 (63 y.o.)    Date: September 6, 2024     Referring Provider: Apollo Velazquez DO    CHIEF COMPLAINT:      HPI: This is a pleasant 63 y.o. female presents with  Admitted for evaluation of bacteremia  We are consulted to evaluate for OLGA to look for possible source if the infection   No known valvular or cardiac issues  No known pacer  No chest pain lately   Baseline dyspnea             Echo: No results found for this or any previous visit.       All labs, EKG's, diagnostic testing and images as well as cardiac cath, stress testing were reviewed during this encounter    Past Medical History:   Diagnosis Date    Atrial fibrillation (HCC)      Past Surgical History:   Procedure Laterality Date    ABLATION OF DYSRHYTHMIC FOCUS      CARPAL TUNNEL RELEASE Bilateral 2012    COLONOSCOPY N/A 9/5/2024    COLONOSCOPY BIOPSY performed by Jameson Fields MD at [facility] ENDOSCOPY    COLONOSCOPY  9/5/2024    COLONOSCOPY POLYPECTOMY SNARE/BIOPSY performed by Jameson Fiedls MD at [facility] ENDOSCOPY    CT ASP ABS HEMATOMA BULLA CYST  9/3/2024    CT ASP ABS HEMATOMA BULLA CYST 9/3/2024 [facility] CT SCAN    HYSTERECTOMY, VAGINAL  2008    NECK SURGERY  06/01/2022    C5-C7    UPPER GASTROINTESTINAL ENDOSCOPY N/A 9/4/2024    ESOPHAGOGASTRODUODENOSCOPY BIOPSY performed by Jameson Fields MD at [facility] ENDOSCOPY     Current Facility-Administered Medications   Medication Dose Route Frequency Provider Last Rate Last Admin    cefTRIAXone (ROCEPHIN) 2,000 mg in sodium chloride 0.9 % 50 mL IVPB (mini-bag)  2,000 mg IntraVENous Q24H Durga Romo MD   Stopped at 09/05/24 2145    metroNIDAZOLE (FLAGYL) 500 mg in 0.9% NaCl 100 mL IVPB premix  500 mg IntraVENous Q8H Durga Romo MD   Stopped at 09/06/24 0812    sucralfate (CARAFATE) tablet 1 g  1 g Oral 4x Daily AC & HS Jameson Fields MD   1 g at 09/06/24 0621    acetaminophen  us with questions.    Electronically signed by Geoffrey Chao MD PGY1 IM resident on 9/6/2024 at 8:13 AM    Interventional Cardiology - [facility]     Patient was seen and examined  Discussed with the team and staff on rounds  Admitted for infectious causes  Bacteremia   Asked to see for a OLGA  Obtain a OLGA to further evaluate. Risks and benefits were discussed with the patient at length. Patient was agreeable after understanding the procedure details and risks.  Thank you for allowing me to participate in the care of your patient. Please don't hesitate to contact me regarding any further issues related to the patient care    Morgan Norton MD

## 2024-09-06 NOTE — DISCHARGE SUMMARY
DISCHARGE SUMMARY      Patient Identification:   Leena Zuñiga   : 1960  MRN: [id number]   Account: [id number]      Patient's PCP: Caryn Damon APRN - CNP    Admit Date: 2024     Discharge Date:   24 decreased    Admitting Physician: Apollo Velazquez, DO     Discharge Physician: River Lorenzo, DO     Discharge Diagnoses:    Acute hypoxic respiratory failure, improved  Sepsis leukocytosis, tachycardia, lactic acidosis, fever with gram-positive bacteremia purulence but 50s, with uncertain etiology-unlikely UTI, possible liver abscess  Lower left hepatic lobe abscess, possible hemangioma right hepatic lobe  UTI with E. Coli  Possible pneumonia of bilateral lower lobe, likely due to gram-positive organism  Left upper quadrant pain, likely due to moderate bile gastritis  Leukocytosis  Hypoalbuminemia  Normocytic anemia, secondary to iron deficiency  Nephrolithiasis, left  T5-T6 disc herniation, moderate spinal canal stenosis  Degenerative disc disease  Obesity      Assessment and Plan from progress note on day of discharge:    Acute hypoxic respiratory failure, improved  -Episode of 88% on room air SpO2 in the ER, started on 2lpm O2 via NC  -Likely related to bilateral lower lung atelectasis/infiltrate  -Continue ceftriaxone 2 g daily  -Weaned O2 on , no longer on supplemental O2     Sepsis with leukocytosis, tachycardia, lactic acidosis, fever with gram (+) bacteremia (prelim strep species) with uncertain etiology- unlikely UTI, possible liver abscess  -SIRS 3/, with fever, tachycardia, leukocytosis on arrival 19.5 ()  -Investigating etiology, b/L lower lung atelectasis/infiltrate, confirmed UTI, liver abscess  -Lactate 0.62 (), repeat lactic acid 0.6 on ()  -Inflammatory markers (ESR 90 and CRP 25.75) elevated   -Blood cultures x2 on arrival positive for strep species   -Abscess confirmed, ?disseminated strep  -ID following, appreciate recommendations  - Repeated blood  as needed for pain and fever, respectively.      9/3: IR acquired a sample of the liver abscess, culture and sensitivity positive for gram positive cocci in pairs and chains. EGD 9/4. Continue rocephin 2g IV and flagyl 500mg IV. Toradol as needed for pain, tylenol as needed for fever.      9/2: Infectious disease consulted appreciate recommendations, GI consulted appreciate recommendations, continue Rocephin 2 g IV and metronidazole 500 mg IV, IR procedure order placed will consult in the a.m., Toradol as needed for pain, Tylenol as needed for fevers, possible disseminated strep infection     9/1: Investigating etiology of her sepsis, MRI suggests possible metastasis, trend LFTs, microbiology cultures pending, continue Rocephin 2 g and Flagyl 500 mg.,      As per HPI, \"Leena Zuñiga is a 63 y.o. female with PMHx of GERD, neuropathy, A-fib who presents to [facility] with fever and generalized body aches.  Patient states that for the last 2 months she has not been feeling well. Since Thursday she has been having intermittent fevers, but took temperature today and was found to have a fever of 102 while at home. Admits to having sweating and epigastric pain that is now radiating to the LUQ. Denies vomiting and diarrhea. Admits to some nausea and decreased oral intake due to the pain.      ED course: Patient was a direct admit from OSH.\"        Note the outpatient provider:  Leena Zuñiga was admitted from 9/1 to 9/6 for fever and generalized body aches.  It was found that she had a strep intermedius infection that manifested as bacteremia, hepatic abscess, and possible GI infections.  She was treated with Rocephin and Flagyl IV during her stay.  She had EGD that was negative for PUD, had a colonoscopy with a polypectomy, CT-guided liver abscess culture, negative OLGA for vegetation, and a negative CTE possible infectious workup.  She was discharged in stable condition on 9/6 with instructions to take

## 2024-09-06 NOTE — PROGRESS NOTES
Discharge teaching and instructions for diagnosis/procedure of blood infection completed with patient using teachback method. AVS reviewed. Patient voiced understanding regarding prescriptions, follow up appointments, and care of self at home. Discharged in a wheelchair to  home with support per self

## 2024-09-06 NOTE — PLAN OF CARE
Problem: Discharge Planning  Goal: Discharge to home or other facility with appropriate resources  9/6/2024 1256 by Derek Marsh RN  Outcome: Progressing  Flowsheets (Taken 9/5/2024 0809 by Chelsea Richardson, RN)  Discharge to home or other facility with appropriate resources: Identify barriers to discharge with patient and caregiver  9/6/2024 0014 by Aiden Ge RN  Outcome: Progressing  Flowsheets (Taken 9/5/2024 0809 by Chelsea Richardson, RN)  Discharge to home or other facility with appropriate resources: Identify barriers to discharge with patient and caregiver     Problem: Pain  Goal: Verbalizes/displays adequate comfort level or baseline comfort level  9/6/2024 1256 by Derek Marsh RN  Outcome: Progressing  Flowsheets (Taken 9/5/2024 1129 by Chelsea Richardson, RN)  Verbalizes/displays adequate comfort level or baseline comfort level: Encourage patient to monitor pain and request assistance  9/6/2024 0014 by Aiden Ge RN  Outcome: Progressing  Flowsheets (Taken 9/5/2024 1129 by Chelsea Richardson, RN)  Verbalizes/displays adequate comfort level or baseline comfort level: Encourage patient to monitor pain and request assistance     Problem: Safety - Adult  Goal: Free from fall injury  9/6/2024 1256 by Derek Marsh, RN  Outcome: Progressing  Flowsheets (Taken 9/1/2024 1215)  Free From Fall Injury: Instruct family/caregiver on patient safety  9/6/2024 0014 by Aiden Ge RN  Outcome: Progressing  Flowsheets (Taken 9/1/2024 1215 by Derek Marsh, RN)  Free From Fall Injury: Instruct family/caregiver on patient safety

## 2024-09-06 NOTE — PROGRESS NOTES
[facility] Infectious Disease         Progress Note      Leena Zuñiga  MEDICAL RECORD NUMBER:  [id number]  AGE: 63 y.o.   GENDER: female  : 1960  EPISODE DATE:  2024      Assessment:     Pt is a 63yof I am consulted for strep septicemia in setting of liver lesion and epigastric pain. Currently on LEBLANC and CTX.     The strep is pcn S, I would recommend high dose amox clav given poor beta lactam penetration into serum.      - Plan for OLGA today  - If negative, plan for transition to high dose amox clav (875 tid)  - Cover for 14 days from initial blood culture   - EOT   - Then transition to augmentin bid to complete hepatic abscess treatment for 4 weeks   - Will need repeat imaging prior to dc   - EOT  for hepatic abscess       Subjective:     Chief Complaint   Patient presents with    Fever    Generalized Body Aches         Pt w/ NAEON. I would continue ctx/leblanc for now. When ready for discharge, I have a plan for oral with augmentin.     She is overall doing well today and has no complaints/concerns.     Patient Active Problem List   Diagnosis Code    Paroxysmal atrial fibrillation ([facility]) I48.0    Stenosis of cervical spine M48.02    Traumatic edema of cervical spinal cord ([facility]) S14.0XXA    Degeneration of intervertebral disc at C4-C5 level M50.321    DDD (degenerative disc disease), lumbar M51.36    Hx of fusion of cervical spine Z98.1    Polyarthralgia M25.50    Pneumonia due to infectious organism J18.9    Acute respiratory failure with hypoxia ([facility]) J96.01    Sepsis with acute hypoxic respiratory failure without septic shock ([facility]) A41.9, R65.20, J96.01    Liver abscess K75.0    Liver lesion K76.9    Upper abdominal pain R10.10    Urinary tract infection without hematuria N39.0    HAMMAD (acute kidney injury) ([facility]) N17.9    Leukocytosis D72.829    Elevated alkaline phosphatase level R74.8    Thoracic spinal stenosis M48.04    Obesity (BMI 30-39.9) E66.9    History of atrial fibrillation  98.8 °F (37.1 °C) (Oral)   Resp 18   Ht 1.575 m (5' 2\")   Wt 81.9 kg (180 lb 8.9 oz)   SpO2 94%   BMI 33.02 kg/m²   General:  Awake, alert, not in distress.  HEENT: pink conjunctiva, unicteric sclera, moist oral mucosa.  Chest:  bilateral air entry, Clear to auscultation,   Cardiovascular:  RRR ,S1S2, no murmur or gallop.  Abdomen:  Soft, non tender to palpation.  Extremities: no edema  Skin:  Warm and dry.  CNS: aox3         LABS       CBC:   Lab Results   Component Value Date/Time    WBC 14.2 09/06/2024 05:30 AM    HGB 10.1 09/06/2024 05:30 AM    HGB 14.6 09/05/2011 12:20 PM    HCT 31.1 09/06/2024 05:30 AM    MCV 87.9 09/06/2024 05:30 AM     09/06/2024 05:30 AM     BMP:   Lab Results   Component Value Date/Time     09/06/2024 05:30 AM    K 3.9 09/06/2024 05:30 AM    K 3.9 01/19/2023 03:28 PM     09/06/2024 05:30 AM    CO2 25 09/06/2024 05:30 AM    BUN 13 09/06/2024 05:30 AM    CREATININE 0.5 09/06/2024 05:30 AM     PT/INR:   Lab Results   Component Value Date/Time    INR 0.94 01/19/2023 03:28 PM     Prealbumin: No results found for: \"PREALBUMIN\"  Albumin:No results found for: \"LABALBU\"  Sed Rate:  Lab Results   Component Value Date/Time    SEDRATE 90 08/31/2024 10:30 PM     CRP:   Lab Results   Component Value Date/Time    CRP 25.75 08/31/2024 10:30 PM     Micro:   Lab Results   Component Value Date/Time    BC No growth 24 hours. No growth 48 hours. 09/02/2024 11:37 AM      Hemoglobin A1C: No results found for: \"LABA1C\"        Electronically signed by Durga Romo MD TD@ at 8:35 AM

## 2024-09-06 NOTE — PROGRESS NOTES
PROGRESS NOTE      Patient:  Leena Zuñiga  Unit/Bed:-05/005-A  YOB: 1960  MRN: [id number]   Acct: [id number]    PCP: Caryn Damon APRN - CNP    Date of Admission: 8/31/2024 LOS: 6    Date of Evaluation:  9/6/2024    Anticipated Discharge: Pending clinical status/evaluation of infection    Assessment/Plan:    Acute hypoxic respiratory failure, improved  -Episode of 88% on room air SpO2 in the ER, started on 2lpm O2 via NC  -Likely related to bilateral lower lung atelectasis/infiltrate  -Continue ceftriaxone 2 g daily  -Weaned O2 on 9/1, no longer on supplemental O2    Sepsis with leukocytosis, tachycardia, lactic acidosis, fever with gram (+) bacteremia (prelim strep species) with uncertain etiology- unlikely UTI, possible liver abscess  -SIRS 3/4, with fever, tachycardia, leukocytosis on arrival 19.5 (8/31)  -Investigating etiology, b/L lower lung atelectasis/infiltrate, confirmed UTI, liver abscess  -Lactate 0.62 (8/31), repeat lactic acid 0.6 on (9/2)  -Inflammatory markers (ESR 90 and CRP 25.75) elevated 8/31  -Blood cultures x2 on arrival positive for strep species   -Abscess confirmed, ?disseminated strep  -ID following, appreciate recommendations  - Repeated blood cultures x2, negative for growth 9/3   - IR procedure (CT guided aspiration of abscess) 9/3 noted 6mL of thick green fluid removed, sample sent to path   -Culture from hepatic abscess positive for gram positive cocci,   -Confirmed strep intermedius   -EGD negative for PUD, unclear source of infection   -Continue Rocephin 2 g IV and metronidazole 500 mg IV as per ID, will swap to po augmentin depending on OLGA results   -OLGA pending 9/6    Lower left hepatic lobe abscess, possible hemangioma in dome of right hepatic lobe  -MRI 9/1 showed 3 x 2.1 cm probable hemangioma in the dome of the right lobe of the liver, 4.8 x 3.6 cm and 3.1 x 3.1 cm areas in the left lobe of the liver with abnormal enhancement, metastatic disease

## 2024-09-06 NOTE — PROGRESS NOTES
Prayer and encouragement as she was about to go.   09/06/24 [id number]   Encounter Summary   Service Provided For Patient and family together   Referral/Consult From Rounding   Support System Spouse   Last Encounter  09/06/24   Complexity of Encounter Low   Assessment/Intervention/Outcome   Assessment Hopeful   Intervention Empowerment

## 2024-09-07 LAB
BACTERIA BLD AEROBE CULT: NORMAL
BACTERIA BLD AEROBE CULT: NORMAL

## 2024-09-08 LAB
BACTERIA SPEC AEROBE CULT: ABNORMAL
BACTERIA SPEC AEROBE CULT: ABNORMAL
BACTERIA SPEC ANAEROBE CULT: ABNORMAL
GRAM STN SPEC: ABNORMAL
ORGANISM: ABNORMAL

## 2024-09-25 ENCOUNTER — HOSPITAL ENCOUNTER (OUTPATIENT)
Age: 64
Discharge: HOME | End: 2024-09-25
Payer: COMMERCIAL

## 2024-09-25 DIAGNOSIS — R79.89 ABNORMAL CBC: ICD-10-CM

## 2024-09-25 DIAGNOSIS — D50.9 IRON DEFICIENCY ANEMIA, UNSPECIFIED IRON DEFICIENCY ANEMIA TYPE: ICD-10-CM

## 2024-09-25 DIAGNOSIS — K75.0 LIVER ABSCESS: ICD-10-CM

## 2024-09-25 LAB
ALBUMIN SERPL BCG-MCNC: 3.9 G/DL (ref 3.5–5.1)
ALP SERPL-CCNC: 131 U/L (ref 38–126)
ALT SERPL W/O P-5'-P-CCNC: 11 U/L (ref 11–66)
ANION GAP SERPL CALC-SCNC: 11 MEQ/L (ref 8–16)
AST SERPL-CCNC: 17 U/L (ref 5–40)
BASOPHILS ABSOLUTE: 0.1 THOU/MM3 (ref 0–0.1)
BASOPHILS NFR BLD AUTO: 0.7 %
BILIRUB SERPL-MCNC: 0.2 MG/DL (ref 0.3–1.2)
BUN SERPL-MCNC: 9 MG/DL (ref 7–22)
CALCIUM SERPL-MCNC: 9.4 MG/DL (ref 8.5–10.5)
CHLORIDE SERPL-SCNC: 106 MEQ/L (ref 98–111)
CO2 SERPL-SCNC: 27 MEQ/L (ref 23–33)
CREAT SERPL-MCNC: 0.6 MG/DL (ref 0.4–1.2)
DEPRECATED RDW RBC AUTO: 48.4 FL (ref 35–45)
EOSINOPHIL NFR BLD AUTO: 3 %
EOSINOPHILS ABSOLUTE: 0.2 THOU/MM3 (ref 0–0.4)
ERYTHROCYTE [DISTWIDTH] IN BLOOD BY AUTOMATED COUNT: 14.5 % (ref 11.5–14.5)
GFR SERPL CREATININE-BSD FRML MDRD: > 90 ML/MIN/1.73M2
GLUCOSE SERPL-MCNC: 94 MG/DL (ref 70–108)
HCT VFR BLD AUTO: 38.6 % (ref 37–47)
HGB BLD-MCNC: 12 GM/DL (ref 12–16)
IMM GRANULOCYTES # BLD AUTO: 0.02 THOU/MM3 (ref 0–0.07)
IMM GRANULOCYTES NFR BLD AUTO: 0.2 %
LYMPHOCYTES ABSOLUTE: 3.7 THOU/MM3 (ref 1–4.8)
LYMPHOCYTES NFR BLD AUTO: 45.9 %
MCH RBC QN AUTO: 28.4 PG (ref 26–33)
MCHC RBC AUTO-ENTMCNC: 31.1 GM/DL (ref 32.2–35.5)
MCV RBC AUTO: 91.3 FL (ref 81–99)
MONOCYTES ABSOLUTE: 0.4 THOU/MM3 (ref 0.4–1.3)
MONOCYTES NFR BLD AUTO: 5.5 %
NEUTROPHILS ABSOLUTE: 3.6 THOU/MM3 (ref 1.8–7.7)
NEUTROPHILS NFR BLD AUTO: 44.7 %
NRBC BLD AUTO-RTO: 0 /100 WBC
PLATELET # BLD AUTO: 389 THOU/MM3 (ref 130–400)
PMV BLD AUTO: 11.2 FL (ref 9.4–12.4)
POTASSIUM SERPL-SCNC: 4.1 MEQ/L (ref 3.5–5.2)
PROT SERPL-MCNC: 6.6 G/DL (ref 6.1–8)
RBC # BLD AUTO: 4.23 MILL/MM3 (ref 4.2–5.4)
SODIUM SERPL-SCNC: 144 MEQ/L (ref 135–145)
WBC # BLD AUTO: 8.1 THOU/MM3 (ref 4.8–10.8)

## 2024-09-25 PROCEDURE — 85025 COMPLETE CBC W/AUTO DIFF WBC: CPT

## 2024-09-25 PROCEDURE — 80053 COMPREHEN METABOLIC PANEL: CPT

## 2024-09-25 PROCEDURE — 36415 COLL VENOUS BLD VENIPUNCTURE: CPT

## 2024-09-26 ENCOUNTER — TELEPHONE (OUTPATIENT)
Dept: INFECTIOUS DISEASES | Age: 64
End: 2024-09-26

## 2024-09-26 RX ORDER — DILTIAZEM HYDROCHLORIDE 120 MG/1
120 CAPSULE, EXTENDED RELEASE ORAL DAILY
COMMUNITY
Start: 2024-09-18

## 2024-09-30 ENCOUNTER — OFFICE VISIT (OUTPATIENT)
Dept: INFECTIOUS DISEASES | Age: 64
End: 2024-09-30
Payer: COMMERCIAL

## 2024-09-30 VITALS
HEIGHT: 62 IN | SYSTOLIC BLOOD PRESSURE: 128 MMHG | WEIGHT: 166 LBS | BODY MASS INDEX: 30.55 KG/M2 | RESPIRATION RATE: 18 BRPM | OXYGEN SATURATION: 96 % | TEMPERATURE: 97.3 F | DIASTOLIC BLOOD PRESSURE: 86 MMHG | HEART RATE: 85 BPM

## 2024-09-30 DIAGNOSIS — Z98.1 HX OF FUSION OF CERVICAL SPINE: ICD-10-CM

## 2024-09-30 DIAGNOSIS — E66.9 OBESITY (BMI 30-39.9): ICD-10-CM

## 2024-09-30 DIAGNOSIS — I48.0 PAROXYSMAL ATRIAL FIBRILLATION (HCC): ICD-10-CM

## 2024-09-30 DIAGNOSIS — K75.0 HEPATIC ABSCESS: Primary | ICD-10-CM

## 2024-09-30 DIAGNOSIS — A41.9 SEPTICEMIA (HCC): ICD-10-CM

## 2024-09-30 DIAGNOSIS — Z09 HOSPITAL DISCHARGE FOLLOW-UP: ICD-10-CM

## 2024-09-30 DIAGNOSIS — R10.13 EPIGASTRIC PAIN: ICD-10-CM

## 2024-09-30 PROCEDURE — 99214 OFFICE O/P EST MOD 30 MIN: CPT | Performed by: STUDENT IN AN ORGANIZED HEALTH CARE EDUCATION/TRAINING PROGRAM

## 2024-09-30 NOTE — PROGRESS NOTES
Progress note: Infectious diseases    Patient - Leena Zuñiga  Age - 63 y.o.      - 1960      Date- 2024        ASSESSMENT/PLAN   1. Hepatic abscess  The strep is pcn S, I would recommend high dose amox clav given poor beta lactam penetration into serum.    Hepatic abscess tends to be polymicrobial. This patient had monomicrobial disease and did undergo yaa which was negative. Patient initially received IV then transitioned to oral therapy with augmentin bid for coverage of strep bacteremia and abscess.      - Cover for 14 days from initial blood culture              - EOT   - Then transition to augmentin bid to complete hepatic abscess treatment for 4 weeks              - EOT  for hepatic abscess    - Patient has completed therapy but still needs repeat imaging of the a/p   - I have ordered and ultrasound and CT scan to reassess the area   - Brief additional course of augmentin called in    2. Hospital discharge follow-up      3. Septicemia (HCC)      4. Hx of fusion of cervical spine      5. Epigastric pain      6. Obesity (BMI 30-39.9)      7. Paroxysmal atrial fibrillation (HCC)          SUBJECTIVE:   Patient presented on  with fevers and epigastric discomfort. Shoe noted radiation to the luq. Fever was noted to 102. She states that the epigastric discomfort has been ongoing for 3 weeks. She has been on pepcid without improvement. She otherwise has not had any other significant symptoms.      On arrival, patient was noted be febrile. Labs significant for leukocytosis. Blood cx obtained with biofire identified strep species (Gram stain with GPC in chains). CT a/p followed by MRI with evidence of a liver lesion of unclear etiology.      Patient lives in [facility] with her . She previously underwent a spinal mri which reportedly there was evidence of a lesion of the liver at that time. There

## 2024-10-01 ENCOUNTER — HOSPITAL ENCOUNTER (OUTPATIENT)
Dept: ULTRASOUND IMAGING | Age: 64
Discharge: HOME | End: 2024-10-01

## 2024-10-01 DIAGNOSIS — R10.13 EPIGASTRIC PAIN: ICD-10-CM

## 2024-10-01 DIAGNOSIS — Z09 HOSPITAL DISCHARGE FOLLOW-UP: ICD-10-CM

## 2024-10-01 DIAGNOSIS — Z98.1 HX OF FUSION OF CERVICAL SPINE: ICD-10-CM

## 2024-10-01 DIAGNOSIS — K75.0 HEPATIC ABSCESS: ICD-10-CM

## 2024-10-01 DIAGNOSIS — E66.9 OBESITY (BMI 30-39.9): ICD-10-CM

## 2024-10-01 DIAGNOSIS — I48.0 PAROXYSMAL ATRIAL FIBRILLATION (HCC): ICD-10-CM

## 2024-10-01 DIAGNOSIS — A41.9 SEPTICEMIA (HCC): ICD-10-CM

## 2024-10-01 PROCEDURE — 9900000021 US VASCULAR SCREENING

## 2024-10-02 ENCOUNTER — HOSPITAL ENCOUNTER (OUTPATIENT)
Dept: CT IMAGING | Age: 64
Discharge: HOME | End: 2024-10-02
Payer: COMMERCIAL

## 2024-10-02 DIAGNOSIS — E66.9 OBESITY (BMI 30-39.9): ICD-10-CM

## 2024-10-02 DIAGNOSIS — K75.0 HEPATIC ABSCESS: ICD-10-CM

## 2024-10-02 DIAGNOSIS — R10.13 EPIGASTRIC PAIN: ICD-10-CM

## 2024-10-02 DIAGNOSIS — Z09 HOSPITAL DISCHARGE FOLLOW-UP: ICD-10-CM

## 2024-10-02 DIAGNOSIS — I48.0 PAROXYSMAL ATRIAL FIBRILLATION (HCC): ICD-10-CM

## 2024-10-02 DIAGNOSIS — A41.9 SEPTICEMIA (HCC): ICD-10-CM

## 2024-10-02 DIAGNOSIS — Z98.1 HX OF FUSION OF CERVICAL SPINE: ICD-10-CM

## 2024-10-02 PROCEDURE — 74178 CT ABD&PLV WO CNTR FLWD CNTR: CPT

## 2024-10-02 PROCEDURE — 6360000004 HC RX CONTRAST MEDICATION: Performed by: STUDENT IN AN ORGANIZED HEALTH CARE EDUCATION/TRAINING PROGRAM

## 2024-10-02 RX ORDER — IOPAMIDOL 755 MG/ML
100 INJECTION, SOLUTION INTRAVASCULAR
Status: COMPLETED | OUTPATIENT
Start: 2024-10-02 | End: 2024-10-02

## 2024-10-02 RX ADMIN — IOHEXOL 50 ML: 240 INJECTION, SOLUTION INTRATHECAL; INTRAVASCULAR; INTRAVENOUS; ORAL at 13:30

## 2024-10-02 RX ADMIN — IOPAMIDOL 100 ML: 755 INJECTION, SOLUTION INTRAVENOUS at 14:30

## 2024-10-08 ENCOUNTER — TELEPHONE (OUTPATIENT)
Dept: INFECTIOUS DISEASES | Age: 64
End: 2024-10-08

## 2024-10-08 DIAGNOSIS — K75.0 HEPATIC ABSCESS: Primary | ICD-10-CM

## 2024-10-08 NOTE — TELEPHONE ENCOUNTER
Patient is on augmentin for treatment of hepatic abscess. I repeated imaging with a ct a/p that demonstrates left hepatic lobe persistent collections though resolution of the fluid that underwent drainage. She has received over 4 weeks of therapy. The other consideration is if she does need further aspiration to be performed. I have attempted to call patient with these results.     Electronically signed by Durga Romo MD on 10/8/2024 at 3:16 PM

## 2024-10-09 ENCOUNTER — TELEPHONE (OUTPATIENT)
Dept: INFECTIOUS DISEASES | Age: 64
End: 2024-10-09

## 2024-10-09 NOTE — TELEPHONE ENCOUNTER
Placed extension to augmentin for 30 days given persistence of fluid collection in left hepatic lobe.

## 2024-11-06 ENCOUNTER — TELEPHONE (OUTPATIENT)
Dept: INFECTIOUS DISEASES | Age: 64
End: 2024-11-06

## 2024-11-06 NOTE — TELEPHONE ENCOUNTER
Returned call to patient letting her know that she didn't need any further x-rays and that Dr. Romo wanted her to continue taking the Augmentin and he refilled it for her.  I told patient I am waiting for a call back from Interventional  radiology for me to schedule her a appointment with them.  Patient voiced understanding.

## 2024-11-07 DIAGNOSIS — R10.13 EPIGASTRIC PAIN: ICD-10-CM

## 2024-11-07 DIAGNOSIS — K75.0 HEPATIC ABSCESS: Primary | ICD-10-CM

## 2024-11-07 DIAGNOSIS — A41.9 SEPTICEMIA (HCC): ICD-10-CM

## 2024-11-13 ENCOUNTER — TELEPHONE (OUTPATIENT)
Dept: INFECTIOUS DISEASES | Age: 64
End: 2024-11-13

## 2024-11-13 NOTE — TELEPHONE ENCOUNTER
Patient called left a message yesterday on what the plan will be after her appointment with Interventional Radiology.    I returned call on Thursday, Nov., 13.   I told patient that I would find out frome Dr. Romo if he wants to see her again in the office and what the plan is for the Augmentin.  Patient stated, I will call you back or when you talk to Dr. Romo give me a call back.

## 2024-11-14 ENCOUNTER — TELEPHONE (OUTPATIENT)
Dept: INFECTIOUS DISEASES | Age: 64
End: 2024-11-14

## 2024-11-26 ENCOUNTER — HOSPITAL ENCOUNTER (OUTPATIENT)
Dept: CT IMAGING | Age: 64
Discharge: HOME | End: 2024-11-26
Payer: COMMERCIAL

## 2024-11-26 VITALS
HEIGHT: 62 IN | DIASTOLIC BLOOD PRESSURE: 73 MMHG | RESPIRATION RATE: 12 BRPM | SYSTOLIC BLOOD PRESSURE: 129 MMHG | OXYGEN SATURATION: 95 % | TEMPERATURE: 96.5 F | WEIGHT: 160 LBS | HEART RATE: 57 BPM | BODY MASS INDEX: 29.44 KG/M2

## 2024-11-26 DIAGNOSIS — A41.9 SEPTICEMIA (HCC): ICD-10-CM

## 2024-11-26 DIAGNOSIS — K75.0 HEPATIC ABSCESS: ICD-10-CM

## 2024-11-26 DIAGNOSIS — R10.13 EPIGASTRIC PAIN: ICD-10-CM

## 2024-11-26 LAB
DEPRECATED RDW RBC AUTO: 52.3 FL (ref 35–45)
ERYTHROCYTE [DISTWIDTH] IN BLOOD BY AUTOMATED COUNT: 15.9 % (ref 11.5–14.5)
HCT VFR BLD AUTO: 41.6 % (ref 37–47)
HGB BLD-MCNC: 13.6 GM/DL (ref 12–16)
MCH RBC QN AUTO: 29.4 PG (ref 26–33)
MCHC RBC AUTO-ENTMCNC: 32.7 GM/DL (ref 32.2–35.5)
MCV RBC AUTO: 89.8 FL (ref 81–99)
PLATELET # BLD AUTO: 307 THOU/MM3 (ref 130–400)
PMV BLD AUTO: 9.7 FL (ref 9.4–12.4)
RBC # BLD AUTO: 4.63 MILL/MM3 (ref 4.2–5.4)
WBC # BLD AUTO: 10.8 THOU/MM3 (ref 4.8–10.8)

## 2024-11-26 PROCEDURE — 2580000003 HC RX 258: Performed by: RADIOLOGY

## 2024-11-26 PROCEDURE — 6360000004 HC RX CONTRAST MEDICATION: Performed by: STUDENT IN AN ORGANIZED HEALTH CARE EDUCATION/TRAINING PROGRAM

## 2024-11-26 PROCEDURE — 10140 I&D HMTMA SEROMA/FLUID COLLJ: CPT

## 2024-11-26 PROCEDURE — 85027 COMPLETE CBC AUTOMATED: CPT

## 2024-11-26 RX ORDER — MIDAZOLAM HYDROCHLORIDE 1 MG/ML
1 INJECTION, SOLUTION INTRAMUSCULAR; INTRAVENOUS ONCE
Status: DISCONTINUED | OUTPATIENT
Start: 2024-11-26 | End: 2024-11-27 | Stop reason: HOSPADM

## 2024-11-26 RX ORDER — SODIUM CHLORIDE 450 MG/100ML
INJECTION, SOLUTION INTRAVENOUS CONTINUOUS
Status: DISCONTINUED | OUTPATIENT
Start: 2024-11-26 | End: 2024-11-27 | Stop reason: HOSPADM

## 2024-11-26 RX ORDER — FENTANYL CITRATE 50 UG/ML
50 INJECTION, SOLUTION INTRAMUSCULAR; INTRAVENOUS ONCE
Status: DISCONTINUED | OUTPATIENT
Start: 2024-11-26 | End: 2024-11-27 | Stop reason: HOSPADM

## 2024-11-26 RX ORDER — IOPAMIDOL 755 MG/ML
80 INJECTION, SOLUTION INTRAVASCULAR
Status: COMPLETED | OUTPATIENT
Start: 2024-11-26 | End: 2024-11-26

## 2024-11-26 RX ADMIN — SODIUM CHLORIDE: 4.5 INJECTION, SOLUTION INTRAVENOUS at 07:20

## 2024-11-26 RX ADMIN — IOPAMIDOL 80 ML: 755 INJECTION, SOLUTION INTRAVENOUS at 09:03

## 2024-11-26 ASSESSMENT — PAIN SCALES - GENERAL: PAINLEVEL_OUTOF10: 0

## 2024-11-26 NOTE — H&P
Formulation and discussion of sedation / procedure plans, risks, benefits, side effects and alternatives with patient and/or responsible adult completed.    History and Physical reviewed and unchanged.    Electronically signed by Brian Leone MD on 11/26/24 at 8:30 AM EST

## 2024-11-26 NOTE — PROGRESS NOTES
0657 pt arrives ambulatory with spouse for hepatic cyst drain. Procedure explained and questions answered. PT RIGHTS AND RESPONSIBILITIES OFFERED TO PT. Pt denies taking blood thinners in past 5 days.pt has been NPO since 11/25/24.  0815 pt to CT via bed.   0915 pt back to OPN. Per tommy RN in IR/ Dr. Leone okay to discharge pt when back to OPN. Pt discharged ambulatory with spouse with instructions with no complaints.   0937 Dr. QUINCY Romo notified of cancelled procedure.                 __m__ Safety:       (Environmental)  Cash to environment  Ensure ID band is correct and in place/ allergy band as needed  Assess for fall risk  Initiate fall precautions as applicable (fall band, side rails, etc.)  Call light within reach  Bed in low position/ wheels locked    _m___ Pain:       Assess pain level and characteristics  Administer analgesics as ordered  Assess effectiveness of pain management and report to MD as needed    _m___ Knowledge Deficit:  Assess baseline knowledge  Provide teaching at level of understanding  Provide teaching via preferred learning method  Evaluate teaching effectiveness    _m___ Hemodynamic/Respiratory Status:       (Pre and Post Procedure Monitoring)  Assess/Monitor vital signs and LOC  Assess Baseline SpO2 prior to any sedation  Obtain weight/height  Assess vital signs/ LOC until patient meets discharge criteria  Monitor procedure site and notify MD of any issues

## 2024-11-26 NOTE — H&P
[facility]  Sedation/Analgesia History & Physical    Pt Name: Leena Zuñiga  MRN: [id number]  YOB: 1960  Provider Performing Procedure: Brian Leone MD, MD  Primary Care Physician: Caryn Damon APRN - CNP    Formulation and discussion of sedation / procedure plans, risks, benefits, side effects and alternatives with patient and/or responsible adult completed.    PRE-PROCEDURE   DNR-CCA/DNR-CC []Yes [x]No  Brief History/Pre-Procedure Diagnosis: liver abscesses          MEDICAL HISTORY  []CAD/Valve  []Liver Disease  []Lung Disease []Diabetes  []Hypertension []Renal Disease  []Additional information:       has a past medical history of Atrial fibrillation (HCC).    SURGICAL HISTORY   has a past surgical history that includes ablation of dysrhythmic focus; Hysterectomy, vaginal (2008); Carpal tunnel release (Bilateral, 2012); Neck surgery (06/01/2022); CT ASP ABS HEMATOMA BULLA CYST (9/3/2024); Upper gastrointestinal endoscopy (N/A, 9/4/2024); Colonoscopy (N/A, 9/5/2024); Colonoscopy (9/5/2024); and transesophageal echocardiogram (N/A, 9/6/2024).  Additional information:       ALLERGIES   Allergies as of 11/26/2024 - Fully Reviewed 11/26/2024   Allergen Reaction Noted    Duloxetine hcl Other (See Comments) 12/29/2023     Additional information:       MEDICATIONS   Coumadin Use Last 5 Days [x]No []Yes  Antiplatelet drug therapy use last 5 days  [x]No []Yes  Other anticoagulant use last 5 days  [x]No []Yes    Current Outpatient Medications:     amoxicillin-clavulanate (AUGMENTIN) 875-125 MG per tablet, Take 1 tablet by mouth 2 times daily, Disp: 60 tablet, Rfl: 0    dilTIAZem (CARDIZEM 12 HR) 120 MG extended release capsule, Take 1 capsule by mouth 2 times daily, Disp: 60 capsule, Rfl: 11    gabapentin (NEURONTIN) 300 MG capsule, take 1 capsule by mouth every morning then 1 capsule IN THE AFTERNOON and 2 capsules at bedtime, Disp: 120 capsule, Rfl: 11    Handicap Placard MIS, by

## 2024-11-26 NOTE — OP NOTE
Department of Radiology  Post Procedure Progress Note      Pre-Procedure Diagnosis:  liver abscesses    Procedure Performed:  CT performed    Findings: Abscesses totally resolved         SEE DICTATED PROCEDURE NOTE FOR COMPLETE DETAILS.    Brian Leone MD   11/26/2024 8:55 AM

## 2024-12-02 ENCOUNTER — TELEPHONE (OUTPATIENT)
Dept: INFECTIOUS DISEASES | Age: 64
End: 2024-12-02

## 2024-12-02 NOTE — TELEPHONE ENCOUNTER
Called patient and let her know Dr. Romo is good with all yg blood work, WBC.  I told her that I would cancel her appointment.

## 2024-12-02 NOTE — TELEPHONE ENCOUNTER
Returned call, let patient know that Dr. Romo stated she could stop her antibiotics and cancel her appointment.    Patient states, \"I am concerned about my blood work, the white blood cells count is a little low.\"  I advised her that I would send Dr. Romo a message and see if he seen her results and if it is anything to be concerned about. I will then call her back.   Patient agreed to this.

## 2025-01-25 NOTE — PROGRESS NOTES
1335 Patient received in IR for left cervical nerve root block. 1338 This procedure has been fully reviewed with the patient and written informed consent has been obtained. 1346 Pt assisted on to IR table, right side down. Procedure started with Dr. Ajith Morton. 1353 Procedure completed; patient immediately states she felt bilateral numbness in her bilateral lower extremities and in her right arm. Pt states she cannot move her lower extremities and cannot move her right arm. However pt is moving all extremities without difficulty, attempting to move herself to her back from right side down laying position. Band aid to lower neck; no bleeding noted. Pt moved to OPN cart; pt using her legs to move herself on the cart. 56 Dr Ajith Morton consulting with Dr Sophia Gusman via telephone; advised to call [facility] Stroke. CODE stroke paged overhead. 1400 Pt alert and oriented x3 and able to answer all questions appropriately. ER staff at bedside performing NIHSS. Pt complaining of multiple various complaints of numbness and tingling including abdomen, upper portions of lower legs, and arms. Pt's complaints inconsistently alternate from right to left side. 1403 Patient taken to CT scanning via cart with Stroke team. OPN notified to bring pt's  to CT area. Infant has roomed in this shift.

## 2025-04-09 ENCOUNTER — HOSPITAL ENCOUNTER (OUTPATIENT)
Age: 65
Discharge: HOME | End: 2025-04-09
Payer: COMMERCIAL

## 2025-04-09 ENCOUNTER — HOSPITAL ENCOUNTER (OUTPATIENT)
Dept: CT IMAGING | Age: 65
Discharge: HOME | End: 2025-04-09
Payer: COMMERCIAL

## 2025-04-09 DIAGNOSIS — H53.8 BLURRED VISION: ICD-10-CM

## 2025-04-09 DIAGNOSIS — H53.412 CENTRAL LOSS OF VISION, LEFT: ICD-10-CM

## 2025-04-09 DIAGNOSIS — H53.9 VISION CHANGES: ICD-10-CM

## 2025-04-09 LAB
CREAT BLD-MCNC: 0.6 MG/DL (ref 0.5–1.2)
GFR SERPL CREATININE-BSD FRML MDRD: > 90 ML/MIN/1.73M2

## 2025-04-09 PROCEDURE — 6360000004 HC RX CONTRAST MEDICATION: Performed by: NURSE PRACTITIONER

## 2025-04-09 PROCEDURE — 70470 CT HEAD/BRAIN W/O & W/DYE: CPT

## 2025-04-09 PROCEDURE — 82565 ASSAY OF CREATININE: CPT

## 2025-04-09 RX ORDER — IOPAMIDOL 755 MG/ML
100 INJECTION, SOLUTION INTRAVASCULAR
Status: COMPLETED | OUTPATIENT
Start: 2025-04-09 | End: 2025-04-09

## 2025-04-09 RX ADMIN — IOPAMIDOL 100 ML: 755 INJECTION, SOLUTION INTRAVENOUS at 10:43

## (undated) DEVICE — BIOGUARD A/W CLEANING ADAPTER

## (undated) DEVICE — GLOVE ORTHO 8   MSG9480